# Patient Record
Sex: FEMALE | Race: WHITE | HISPANIC OR LATINO | Employment: FULL TIME | URBAN - METROPOLITAN AREA
[De-identification: names, ages, dates, MRNs, and addresses within clinical notes are randomized per-mention and may not be internally consistent; named-entity substitution may affect disease eponyms.]

---

## 2018-06-16 ENCOUNTER — HOSPITAL ENCOUNTER (EMERGENCY)
Facility: MEDICAL CENTER | Age: 38
End: 2018-06-16
Attending: EMERGENCY MEDICINE
Payer: MEDICAID

## 2018-06-16 VITALS
SYSTOLIC BLOOD PRESSURE: 126 MMHG | OXYGEN SATURATION: 95 % | HEART RATE: 80 BPM | TEMPERATURE: 97.8 F | WEIGHT: 262.35 LBS | DIASTOLIC BLOOD PRESSURE: 74 MMHG | RESPIRATION RATE: 16 BRPM | BODY MASS INDEX: 51.51 KG/M2 | HEIGHT: 60 IN

## 2018-06-16 DIAGNOSIS — S61.052A CAT BITE OF LEFT THUMB, INITIAL ENCOUNTER: ICD-10-CM
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
## 2018-06-16 PROCEDURE — 90715 TDAP VACCINE 7 YRS/> IM: CPT | Performed by: EMERGENCY MEDICINE

## 2018-06-16 PROCEDURE — 36415 COLL VENOUS BLD VENIPUNCTURE: CPT

## 2018-06-16 PROCEDURE — 90471 IMMUNIZATION ADMIN: CPT

## 2018-06-16 PROCEDURE — 700111 HCHG RX REV CODE 636 W/ 250 OVERRIDE (IP): Performed by: EMERGENCY MEDICINE

## 2018-06-16 PROCEDURE — 99283 EMERGENCY DEPT VISIT LOW MDM: CPT

## 2018-06-16 RX ORDER — AMOXICILLIN AND CLAVULANATE POTASSIUM 875; 125 MG/1; MG/1
1 TABLET, FILM COATED ORAL 2 TIMES DAILY
Qty: 14 TAB | Refills: 0 | Status: SHIPPED | OUTPATIENT
Start: 2018-06-16 | End: 2018-06-23

## 2018-06-16 RX ADMIN — CLOSTRIDIUM TETANI TOXOID ANTIGEN (FORMALDEHYDE INACTIVATED), CORYNEBACTERIUM DIPHTHERIAE TOXOID ANTIGEN (FORMALDEHYDE INACTIVATED), BORDETELLA PERTUSSIS TOXOID ANTIGEN (GLUTARALDEHYDE INACTIVATED), BORDETELLA PERTUSSIS FILAMENTOUS HEMAGGLUTININ ANTIGEN (FORMALDEHYDE INACTIVATED), BORDETELLA PERTUSSIS PERTACTIN ANTIGEN, AND BORDETELLA PERTUSSIS FIMBRIAE 2/3 ANTIGEN 0.5 ML: 5; 2; 2.5; 5; 3; 5 INJECTION, SUSPENSION INTRAMUSCULAR at 23:41

## 2018-06-16 ASSESSMENT — LIFESTYLE VARIABLES: DO YOU DRINK ALCOHOL: NO

## 2018-06-17 NOTE — ED NOTES
All lines and monitors DC'd.  Discharge instructions given, questions answered.  Ambulatory out of ER, escorted by Rn=N.  Pt reports all belongings in possession.  Instructed not to drive after taking pain meds and pt verbalizes understanding.  Rx x one given.

## 2018-06-17 NOTE — ED PROVIDER NOTES
"ED Provider Note    CHIEF COMPLAINT  Chief Complaint   Patient presents with   • Cat Bite     Pt reports, \"I got bit by a stray cat.\"     Seen at 11:21 PM    HPI  Luisa Jeffers is a 37 y.o. female who presents 2 days after cat bite on the left thumb. She was attempting to catch a stray [facility] and [facility]. She was successful but the Bit her. [facility].    The patient has an unknown tetanus status. She does not have any pain or redness of the thumb.    REVIEW OF SYSTEMS  See HPI  PAST MEDICAL HISTORY   has a past medical history of Bilateral ovarian cysts; Depression; Scoliosis; and Spinal stenosis.    SOCIAL HISTORY  Social History     Social History Main Topics   • Smoking status: Former Smoker   • Smokeless tobacco: Never Used      Comment: 2 years ago   • Alcohol use No   • Drug use: No   • Sexual activity: Not on file       SURGICAL HISTORY   has a past surgical history that includes other and gyn surgery.    CURRENT MEDICATIONS  Reviewed.  See Encounter Summary.     ALLERGIES  No Known Allergies    PHYSICAL EXAM  VITAL SIGNS: /78   Pulse 85   Temp 36.6 °C (97.8 °F)   Resp 16   Ht 1.524 m (5')   Wt 119 kg (262 lb 5.6 oz)   SpO2 95%   BMI 51.24 kg/m²   Constitutional: Awake, alert in no apparent distress.  HENT: Normocephalic, Bilateral external ears normal. Nose normal.   Eyes: Conjunctiva normal, non-icteric, EOMI.    Thorax & Lungs: Easy unlabored respirations  Cardiovascular:    Abdomen:  No distention  Skin: Visualized skin is  Dry, No erythema, No rash.   Extremities: Left hand: There are 2 tiny puncture wounds on the lateral aspect of the thumb. There is no erythema, purulence or edema. Range of motion is normal.   Neurologic: Alert, Grossly non-focal.   Psychiatric: Affect and Mood normal    RADIOLOGY  No orders to display     The radiologist's interpretation of all radiological studies have been reviewed by me.  Nursing notes and vital signs " were reviewed. (See chart for details)    Decision Making:  This is a 37 y.o. year old female who presents with a bite left thumb. There is no sign of infection. The bite is low risk for rabies. It was not a unprovoked attack. The cat is under observation. Instructed the patient that if the cat should die in the next 10 days she needs to return immediately for rabies vaccination. I have given her protrusion for Augmentin. There is no sign of infection, I do not feel she needs to start this now. If she notes any redness, swelling or increasing pain she should start the medication immediately.    The patient's blood pressure is elevated today. >120/80. I have referred them to primary care for follow up.       DISPOSITION:  Patient will be discharged home in good condition.    Discharge Medications:  New Prescriptions    AMOXICILLIN-CLAVULANATE (AUGMENTIN) 875-125 MG TAB    Take 1 Tab by mouth 2 times a day for 7 days.       The patient was discharged home (see d/c instructions) and told to return immediately for any signs or symptoms listed, or any worsening at all.  The patient verbally agreed to the discharge precautions and follow-up plan which is documented in EPIC.    FINAL IMPRESSION  1. Cat bite of left thumb, initial encounter

## 2018-06-17 NOTE — ED TRIAGE NOTES
"Luisa Jeffers  37 y.o. female  Chief Complaint   Patient presents with   • Cat Bite     Pt reports, \"I got bit by a stray cat.\"       Pt amb to triage with steady gait for above complaint. Pt reports being bit aprox 3 days ago. Two small puncture wounds noted to left first digit.  Pt is alert and oriented, speaking in full sentences, follows commands and responds appropriately to questions. NAD. Resp are even and unlabored.  Pt placed in lobby. Pt educated on triage process. Pt encouraged to alert staff for any changes.    "

## 2018-06-17 NOTE — DISCHARGE INSTRUCTIONS
Animal Bite  Animal bites can range from mild to serious. An animal bite can result in a scratch on the skin, a deep open cut, a puncture of the skin, a crush injury, or tearing away of the skin or a body part. A small bite from a house pet will usually not cause serious problems. However, some animal bites can become infected or injure a bone or other tissue.  Bites from certain animals can be more dangerous because of the risk of spreading rabies, which is a serious viral infection. This risk is higher with bites from stray animals or wild animals, such as raccoons, foxes, skunks, and bats. Dogs are responsible for most animal bites. Children are bitten more often than adults.  What are the signs or symptoms?  Common symptoms of an animal bite include:  · Pain.  · Bleeding.  · Swelling.  · Bruising.  How is this diagnosed?  This condition may be diagnosed based on a physical exam and medical history. Your health care provider will examine the wound and ask for details about the animal and how the bite happened. You may also have tests, such as:  · Blood tests to check for infection or to determine if surgery is needed.  · X-rays to check for damage to bones or joints.  · Culture test. This uses a sample of fluid from the wound to check for infection.  How is this treated?  Treatment varies depending on the location and type of animal bite and your medical history. Treatment may include:  · Wound care. This often includes cleaning the wound, flushing the wound with saline solution, and applying a bandage (dressing). Sometimes, the wound is left open to heal because of the high risk of infection. However, in some cases, the wound may be closed with stitches (sutures), staples, skin glue, or adhesive strips.  · Antibiotic medicine.  · Tetanus shot.  · Rabies treatment if the animal could have rabies.  In some cases, bites that have become infected may require IV antibiotics and surgical treatment in the  hospital.  Follow these instructions at home:  Wound care  · Follow instructions from your health care provider about how to take care of your wound. Make sure you:  ¨ Wash your hands with soap and water before you change your dressing. If soap and water are not available, use hand .  ¨ Change your dressing as told by your health care provider.  ¨ Leave sutures, skin glue, or adhesive strips in place. These skin closures may need to be in place for 2 weeks or longer. If adhesive strip edges start to loosen and curl up, you may trim the loose edges. Do not remove adhesive strips completely unless your health care provider tells you to do that.  · Check your wound every day for signs of infection. Watch for:  ¨ Increasing redness, swelling, or pain.  ¨ Fluid, blood, or pus.  General instructions  · Take or apply over-the-counter and prescription medicines only as told by your health care provider.  · If you were prescribed an antibiotic, take or apply it as told by your health care provider. Do not stop using the antibiotic even if your condition improves.  · Keep the injured area raised (elevated) above the level of your heart while you are sitting or lying down, if this is possible.  · If directed, apply ice to the injured area.  ¨ Put ice in a plastic bag.  ¨ Place a towel between your skin and the bag.  ¨ Leave the ice on for 20 minutes, 2-3 times per day.  · Keep all follow-up visits as told by your health care provider. This is important.  Contact a health care provider if:  · You have increasing redness, swelling, or pain at the site of your wound.  · You have a general feeling of sickness (malaise).  · You feel nauseous or you vomit.  · You have pain that does not get better.  Get help right away if:  · You have a red streak extending away from your wound.  · You have fluid, blood, or pus coming from your wound.  · You have a fever or chills.  · You have trouble moving your injured area.  · You have  numbness or tingling extending beyond the wound.  This information is not intended to replace advice given to you by your health care provider. Make sure you discuss any questions you have with your health care provider.  Document Released: 09/04/2012 Document Revised: 04/26/2017 Document Reviewed: 05/04/2016  Elsevier Interactive Patient Education © 2017 [employer]

## 2018-09-07 ENCOUNTER — NON-PROVIDER VISIT (OUTPATIENT)
Dept: URGENT CARE | Facility: PHYSICIAN GROUP | Age: 38
End: 2018-09-07

## 2018-09-07 DIAGNOSIS — Z02.1 PRE-EMPLOYMENT DRUG SCREENING: ICD-10-CM

## 2018-09-07 LAB
AMP AMPHETAMINE: NORMAL
COC COCAINE: NORMAL
INT CON NEG: NORMAL
INT CON POS: NORMAL
MET METHAMPHETAMINES: NORMAL
OPI OPIATES: NORMAL
PCP PHENCYCLIDINE: NORMAL
POC DRUG COMMENT 753798-OCCUPATIONAL HEALTH: NORMAL
THC: NORMAL

## 2018-09-07 PROCEDURE — 80305 DRUG TEST PRSMV DIR OPT OBS: CPT | Performed by: NURSE PRACTITIONER

## 2019-08-10 ENCOUNTER — HOSPITAL ENCOUNTER (EMERGENCY)
Facility: MEDICAL CENTER | Age: 39
End: 2019-08-10
Attending: EMERGENCY MEDICINE
Payer: MEDICAID

## 2019-08-10 VITALS
HEIGHT: 60 IN | BODY MASS INDEX: 50.21 KG/M2 | SYSTOLIC BLOOD PRESSURE: 137 MMHG | WEIGHT: 255.73 LBS | HEART RATE: 76 BPM | TEMPERATURE: 97.6 F | DIASTOLIC BLOOD PRESSURE: 81 MMHG | RESPIRATION RATE: 16 BRPM | OXYGEN SATURATION: 97 %

## 2019-08-10 DIAGNOSIS — E86.0 DEHYDRATION: ICD-10-CM

## 2019-08-10 DIAGNOSIS — R11.0 NAUSEA: ICD-10-CM

## 2019-08-10 DIAGNOSIS — R42 DIZZINESS: ICD-10-CM

## 2019-08-10 LAB
ALBUMIN SERPL BCP-MCNC: 4.3 G/DL (ref 3.2–4.9)
ALBUMIN/GLOB SERPL: 1.2 G/DL
ALP SERPL-CCNC: 106 U/L (ref 30–99)
ALT SERPL-CCNC: 17 U/L (ref 2–50)
ANION GAP SERPL CALC-SCNC: 9 MMOL/L (ref 0–11.9)
APPEARANCE UR: ABNORMAL
AST SERPL-CCNC: 15 U/L (ref 12–45)
BASOPHILS # BLD AUTO: 0.6 % (ref 0–1.8)
BASOPHILS # BLD: 0.05 K/UL (ref 0–0.12)
BILIRUB SERPL-MCNC: 0.3 MG/DL (ref 0.1–1.5)
BUN SERPL-MCNC: 16 MG/DL (ref 8–22)
CALCIUM SERPL-MCNC: 9.8 MG/DL (ref 8.5–10.5)
CHLORIDE SERPL-SCNC: 104 MMOL/L (ref 96–112)
CO2 SERPL-SCNC: 29 MMOL/L (ref 20–33)
COLOR UR AUTO: YELLOW
CREAT SERPL-MCNC: 0.88 MG/DL (ref 0.5–1.4)
EOSINOPHIL # BLD AUTO: 0.23 K/UL (ref 0–0.51)
EOSINOPHIL NFR BLD: 2.7 % (ref 0–6.9)
ERYTHROCYTE [DISTWIDTH] IN BLOOD BY AUTOMATED COUNT: 39.4 FL (ref 35.9–50)
GLOBULIN SER CALC-MCNC: 3.6 G/DL (ref 1.9–3.5)
GLUCOSE BLD-MCNC: 150 MG/DL (ref 65–99)
GLUCOSE SERPL-MCNC: 111 MG/DL (ref 65–99)
GLUCOSE UR QL STRIP.AUTO: 500 MG/DL
HCG SERPL QL: NEGATIVE
HCG UR QL: NEGATIVE
HCT VFR BLD AUTO: 45.1 % (ref 37–47)
HGB BLD-MCNC: 13.9 G/DL (ref 12–16)
IMM GRANULOCYTES # BLD AUTO: 0.03 K/UL (ref 0–0.11)
IMM GRANULOCYTES NFR BLD AUTO: 0.3 % (ref 0–0.9)
KETONES UR QL STRIP.AUTO: NEGATIVE MG/DL
LEUKOCYTE ESTERASE UR QL STRIP.AUTO: NEGATIVE
LYMPHOCYTES # BLD AUTO: 3.05 K/UL (ref 1–4.8)
LYMPHOCYTES NFR BLD: 35.3 % (ref 22–41)
MAGNESIUM SERPL-MCNC: 1.9 MG/DL (ref 1.5–2.5)
MCH RBC QN AUTO: 25.8 PG (ref 27–33)
MCHC RBC AUTO-ENTMCNC: 30.8 G/DL (ref 33.6–35)
MCV RBC AUTO: 83.8 FL (ref 81.4–97.8)
MONOCYTES # BLD AUTO: 0.57 K/UL (ref 0–0.85)
MONOCYTES NFR BLD AUTO: 6.6 % (ref 0–13.4)
NEUTROPHILS # BLD AUTO: 4.7 K/UL (ref 2–7.15)
NEUTROPHILS NFR BLD: 54.5 % (ref 44–72)
NITRITE UR QL STRIP.AUTO: NEGATIVE
NRBC # BLD AUTO: 0 K/UL
NRBC BLD-RTO: 0 /100 WBC
PH UR STRIP.AUTO: 6.5 [PH] (ref 5–8)
PHOSPHATE SERPL-MCNC: 4 MG/DL (ref 2.5–4.5)
PLATELET # BLD AUTO: 287 K/UL (ref 164–446)
PMV BLD AUTO: 10.7 FL (ref 9–12.9)
POTASSIUM SERPL-SCNC: 3.8 MMOL/L (ref 3.6–5.5)
PROT SERPL-MCNC: 7.9 G/DL (ref 6–8.2)
PROT UR QL STRIP: NEGATIVE MG/DL
RBC # BLD AUTO: 5.38 M/UL (ref 4.2–5.4)
RBC UR QL AUTO: NEGATIVE
SODIUM SERPL-SCNC: 142 MMOL/L (ref 135–145)
SP GR UR: 1.01 (ref 1–1.03)
WBC # BLD AUTO: 8.6 K/UL (ref 4.8–10.8)

## 2019-08-10 PROCEDURE — 700111 HCHG RX REV CODE 636 W/ 250 OVERRIDE (IP): Performed by: EMERGENCY MEDICINE

## 2019-08-10 PROCEDURE — 82962 GLUCOSE BLOOD TEST: CPT

## 2019-08-10 PROCEDURE — 99284 EMERGENCY DEPT VISIT MOD MDM: CPT

## 2019-08-10 PROCEDURE — 96374 THER/PROPH/DIAG INJ IV PUSH: CPT

## 2019-08-10 PROCEDURE — 81002 URINALYSIS NONAUTO W/O SCOPE: CPT

## 2019-08-10 PROCEDURE — 85025 COMPLETE CBC W/AUTO DIFF WBC: CPT

## 2019-08-10 PROCEDURE — 84100 ASSAY OF PHOSPHORUS: CPT

## 2019-08-10 PROCEDURE — 700105 HCHG RX REV CODE 258: Performed by: EMERGENCY MEDICINE

## 2019-08-10 PROCEDURE — 81025 URINE PREGNANCY TEST: CPT

## 2019-08-10 PROCEDURE — 83735 ASSAY OF MAGNESIUM: CPT

## 2019-08-10 PROCEDURE — 84703 CHORIONIC GONADOTROPIN ASSAY: CPT

## 2019-08-10 PROCEDURE — 36415 COLL VENOUS BLD VENIPUNCTURE: CPT

## 2019-08-10 PROCEDURE — 80053 COMPREHEN METABOLIC PANEL: CPT

## 2019-08-10 PROCEDURE — 81002 URINALYSIS NONAUTO W/O SCOPE: CPT | Performed by: EMERGENCY MEDICINE

## 2019-08-10 RX ORDER — DULOXETIN HYDROCHLORIDE 60 MG/1
60 CAPSULE, DELAYED RELEASE ORAL DAILY
COMMUNITY

## 2019-08-10 RX ORDER — ONDANSETRON 4 MG/1
4 TABLET, ORALLY DISINTEGRATING ORAL EVERY 6 HOURS PRN
Qty: 10 TAB | Refills: 0 | Status: SHIPPED | OUTPATIENT
Start: 2019-08-10 | End: 2020-06-09

## 2019-08-10 RX ORDER — KETOROLAC TROMETHAMINE 30 MG/ML
15 INJECTION, SOLUTION INTRAMUSCULAR; INTRAVENOUS ONCE
Status: COMPLETED | OUTPATIENT
Start: 2019-08-10 | End: 2019-08-10

## 2019-08-10 RX ORDER — DAPAGLIFLOZIN 5 MG/1
TABLET, FILM COATED ORAL
COMMUNITY

## 2019-08-10 RX ORDER — SODIUM CHLORIDE 9 MG/ML
1000 INJECTION, SOLUTION INTRAVENOUS ONCE
Status: COMPLETED | OUTPATIENT
Start: 2019-08-10 | End: 2019-08-10

## 2019-08-10 RX ADMIN — SODIUM CHLORIDE 1000 ML: 9 INJECTION, SOLUTION INTRAVENOUS at 16:13

## 2019-08-10 RX ADMIN — KETOROLAC TROMETHAMINE 15 MG: 30 INJECTION, SOLUTION INTRAMUSCULAR at 16:48

## 2019-08-10 NOTE — ED NOTES
Ambulatory to 38 with same report as triage note.  Onset of symptoms Monday.  Denies fever.  Also reports increased fatigue, thirst.

## 2019-08-10 NOTE — ED TRIAGE NOTES
Pt amb to triage.  Chief Complaint   Patient presents with   • Headache   • Nausea   • Polydipsia   • Dizziness     Pt reports symptoms x5d.  FS in triage 150.

## 2019-08-10 NOTE — ED PROVIDER NOTES
ED Provider Note    Scribed for Devan Martínez D.O. by Torin Silverman. 8/10/2019  3:50 PM    Primary care provider: Jadiel Ignacio M.D.  Means of arrival: Private vehicle  History obtained from: Patient  History limited by: None    CHIEF COMPLAINT  Chief Complaint   Patient presents with   • Headache   • Nausea   • Polydipsia   • Dizziness       HPI  Luisa Jeffers is a 38 y.o. female with a history of diabetes who presents to the Emergency Department with evaluation of lightheadedness onset 5 days ago. Patient endorses associated symptoms of mild headache and nausea. She has been taking ibuprofen 800mg for mild alleviation of her headache. The patient additionally has complaints of left flank pain with associated urinary frequency. She also notes some polydipsia, but has a history of diabetes. Prior to onset symptoms, the patient reports she had positive sick contact with her mother with similar symptoms. She denies nasal discharge, fever, sore throat, cough, dysuria, or abdominal pain. The patient reports no chance of pregnancy as her last menstrual period was 2.5 weeks ago and she has a history of tubal ligation. The patient states her diabetes is well controlled and she follows up with her PCP on monthly basis. She reports her glucose level have been well-maintained and her last A1C was reassuring.     REVIEW OF SYSTEMS  Pertinent positives include nausea, mild headache, lightheadedness, left flank pain, urinary frequency , and polydipsia.  Pertinent negatives include no nasal discharge, fever, sore throat, cough, dysuria, or abdominal pain..      PAST MEDICAL HISTORY  Past Medical History:   Diagnosis Date   • Bilateral ovarian cysts    • Depression    • Diabetes (HCC)    • Scoliosis    • Spinal stenosis        SURGICAL HISTORY  Past Surgical History:   Procedure Laterality Date   • GYN SURGERY     • OTHER          SOCIAL HISTORY  Social History     Tobacco Use   • Smoking status: Former Smoker   •  Smokeless tobacco: Never Used   • Tobacco comment: 2 years ago   Substance Use Topics   • Alcohol use: No   • Drug use: No      Social History     Substance and Sexual Activity   Drug Use No       FAMILY HISTORY  History reviewed. No pertinent family history.    CURRENT MEDICATIONS  Home Medications     Reviewed by Larry Aiken R.N. (Registered Nurse) on 08/10/19 at 1503  Med List Status: Complete   Medication Last Dose Status   Buprenorphine HCl-Naloxone HCl (SUBOXONE SL)  Active   clonidine (CATAPRES) 0.1 MG Tab not taking Active   Dapagliflozin Propanediol (FARXIGA) 5 MG Tab 8/10/2019 Active   diazepam (VALIUM) 5 MG Tab not taking Active   DULoxetine (CYMBALTA) 60 MG Cap DR Particles delayed-release capsule 8/10/2019 Active   ibuprofen (MOTRIN) 600 MG TABS prn Active   methocarbamol (ROBAXIN) 500 MG TABS prn Active   ondansetron (ZOFRAN ODT) 4 MG TABLET DISPERSIBLE prn Active                ALLERGIES  No Known Allergies    PHYSICAL EXAM  VITAL SIGNS: /80   Pulse 89   Temp 36.4 °C (97.5 °F) (Temporal)   Resp 16   Ht 1.524 m (5')   Wt 116 kg (255 lb 11.7 oz)   LMP 07/20/2019   SpO2 95%   BMI 49.94 kg/m²     Constitutional: Well developed, Well nourished, No acute distress, Non-toxic appearance.   HENT: Normocephalic, Atraumatic, tympanic membranes normal, dry mucous membranes, No oral exudates, no rhinorrhea.  Eyes: PERRLA, EOMI, Conjunctiva normal, No discharge.   Neck: Normal range of motion, No cervical spine tenderness, Supple, No meningeus, No stridor.  Cardiovascular: Normal heart rate, Normal rhythm, No murmurs, No rubs, No gallops.   Thorax & Lungs:  No respiratory distress, No rales, No rhonchi, No wheezing, No chest tenderness.   Abdomen: Bowel sounds normal, Soft, No tenderness, No guarding, No rebound, No masses, No pulsatile masses.   Skin: Warm, Dry, No erythema, No rash.   Back: No thoracic or lumbar spinetenderness, No CVA tenderness.   Extremities: No edema, No evidence of  deformity, Full range of motion,  Neurologic: Alert & oriented to month and age, Normal cognition, Cranial nerves II-XII are intact, No slurred speech, Negative finger to nose bilaterally, No pronator drift bilaterally,   strength 5/5 bilaterally, Leg raise strength 5/5 bilaterally, Plantarflexion strength 5/5 bilaterally, Dorsiflexion strength 5/5 bilaterally, Deep tendon reflexes 2/4 upper and lower extremities bilaterally, Sensation intact throughout, No Nystagmus.      DIAGNOSTIC STUDIES/PROCEDURES    LABS  Results for orders placed or performed during the hospital encounter of 08/10/19   MAGNESIUM   Result Value Ref Range    Magnesium 1.9 1.5 - 2.5 mg/dL   PHOSPHORUS   Result Value Ref Range    Phosphorus 4.0 2.5 - 4.5 mg/dL   HCG QUAL SERUM   Result Value Ref Range    Beta-Hcg Qualitative Serum Negative Negative   CBC WITH DIFFERENTIAL   Result Value Ref Range    WBC 8.6 4.8 - 10.8 K/uL    RBC 5.38 4.20 - 5.40 M/uL    Hemoglobin 13.9 12.0 - 16.0 g/dL    Hematocrit 45.1 37.0 - 47.0 %    MCV 83.8 81.4 - 97.8 fL    MCH 25.8 (L) 27.0 - 33.0 pg    MCHC 30.8 (L) 33.6 - 35.0 g/dL    RDW 39.4 35.9 - 50.0 fL    Platelet Count 287 164 - 446 K/uL    MPV 10.7 9.0 - 12.9 fL    Neutrophils-Polys 54.50 44.00 - 72.00 %    Lymphocytes 35.30 22.00 - 41.00 %    Monocytes 6.60 0.00 - 13.40 %    Eosinophils 2.70 0.00 - 6.90 %    Basophils 0.60 0.00 - 1.80 %    Immature Granulocytes 0.30 0.00 - 0.90 %    Nucleated RBC 0.00 /100 WBC    Neutrophils (Absolute) 4.70 2.00 - 7.15 K/uL    Lymphs (Absolute) 3.05 1.00 - 4.80 K/uL    Monos (Absolute) 0.57 0.00 - 0.85 K/uL    Eos (Absolute) 0.23 0.00 - 0.51 K/uL    Baso (Absolute) 0.05 0.00 - 0.12 K/uL    Immature Granulocytes (abs) 0.03 0.00 - 0.11 K/uL    NRBC (Absolute) 0.00 K/uL   Comp Metabolic Panel   Result Value Ref Range    Sodium 142 135 - 145 mmol/L    Potassium 3.8 3.6 - 5.5 mmol/L    Chloride 104 96 - 112 mmol/L    Co2 29 20 - 33 mmol/L    Anion Gap 9.0 0.0 - 11.9    Glucose  111 (H) 65 - 99 mg/dL    Bun 16 8 - 22 mg/dL    Creatinine 0.88 0.50 - 1.40 mg/dL    Calcium 9.8 8.5 - 10.5 mg/dL    AST(SGOT) 15 12 - 45 U/L    ALT(SGPT) 17 2 - 50 U/L    Alkaline Phosphatase 106 (H) 30 - 99 U/L    Total Bilirubin 0.3 0.1 - 1.5 mg/dL    Albumin 4.3 3.2 - 4.9 g/dL    Total Protein 7.9 6.0 - 8.2 g/dL    Globulin 3.6 (H) 1.9 - 3.5 g/dL    A-G Ratio 1.2 g/dL   ESTIMATED GFR   Result Value Ref Range    GFR If African American >60 >60 mL/min/1.73 m 2    GFR If Non African American >60 >60 mL/min/1.73 m 2   ACCU-CHEK GLUCOSE   Result Value Ref Range    Glucose - Accu-Ck 150 (H) 65 - 99 mg/dL   POC UA   Result Value Ref Range    POC Color Yellow     POC Appearance Slightly Cloudy (A)     POC Glucose 500 (A) Negative mg/dL    POC Ketones Negative Negative mg/dL    POC Specific Gravity 1.010 1.005 - 1.030    POC Blood Negative Negative    POC Urine PH 6.5 5.0 - 8.0    POC Protein Negative Negative mg/dL    POC Nitrites Negative Negative    POC Leukocyte Esterase Negative Negative   POC URINE PREGNANCY   Result Value Ref Range    POC Urine Pregnancy Test Negative Negative      All labs reviewed by me.    COURSE & MEDICAL DECISION MAKING  Nursing notes, VS, PMSFHx reviewed in chart.    3:50 PM - Patient seen and examined at bedside. Patient will be treated with toradol 15mg. She will additionally be given 1 L of IV fluids for dehydration given her dry mucous membranes. Ordered HCG qual, magnesium, phosphorus, POC UA, POC urine pregnancy, CBC with differential, CMP, and ACCU-Chek glucose to evaluate her symptoms.   Hydration: The patient dry mucous movements, headache and felt dehydrated.  Reevaluation after 1 L normal saline infusion, the patient had significant improvement with cap refill less than 2 seconds, moist mucous membranes, normal heart rate.      This is a charming 38 y.o. female that presents with multiple vague complaints of headache, dizziness, polydipsia, polyuria.  The patient is diabetic but  has no evidence of diabetic ketoacidosis and totally normal blood sugar 11.  She is not acidotic, she does have an anion gap I do not believe she is expensing hyper ischemic episode.  In addition she does not have a leukocytosis, she is afebrile do not suspect meningitis, encephalitis.  Urinalysis is negative for infection.  She is not pregnant excluding ectopic pregnancy.  After 1 L normal saline infusion was given and Toradol 10 mg IV, she states she feels much better.  Her mother was recently diagnosed with a virus thing.  Prior to discharge, she has no focal neurologic deficits, she is acting appropriate, she feels much better.  The patient will be discharged with strict return precautions were      DISPOSITION:  Patient will be discharged home in stable condition.    FOLLOW UP:  [facility], Emergency Dept  [address] [facility] [address] [phone]    If symptoms worsen    Jadiel Ignacio M.D.  [address] [facility] [id number] [phone]    Schedule an appointment as soon as possible for a visit in 1 week  As needed      OUTPATIENT MEDICATIONS:  New Prescriptions    ONDANSETRON (ZOFRAN ODT) 4 MG TABLET DISPERSIBLE    Take 1 Tab by mouth every 6 hours as needed for Nausea.       FINAL IMPRESSION  1. Dehydration Active   2. Nausea Active   3. Dizziness Active          Torin VILLALPANDO (Dre), am scribing for, and in the presence of, Devan Martínez D.O.    Electronically signed by: Torin aM), 8/10/2019    IDevan D.O. personally performed the services described in this documentation, as scribed by Torin Silverman in my presence, and it is both accurate and complete.    C.     The note accurately reflects work and decisions made by me.  Devan Martínez  8/10/2019  7:00 PM

## 2019-08-11 NOTE — ED NOTES
IV d/c'd cath intact; no redness, no swelling noted; drsg applied.  D/C home with written and verbal instructions re: Rx, activity, f/u.  Verbalizes understanding.  Ambulated out

## 2020-06-09 ENCOUNTER — HOSPITAL ENCOUNTER (EMERGENCY)
Facility: MEDICAL CENTER | Age: 40
End: 2020-06-09
Attending: EMERGENCY MEDICINE | Admitting: EMERGENCY MEDICINE
Payer: COMMERCIAL

## 2020-06-09 ENCOUNTER — APPOINTMENT (OUTPATIENT)
Dept: RADIOLOGY | Facility: MEDICAL CENTER | Age: 40
End: 2020-06-09
Attending: EMERGENCY MEDICINE
Payer: COMMERCIAL

## 2020-06-09 VITALS
SYSTOLIC BLOOD PRESSURE: 135 MMHG | RESPIRATION RATE: 16 BRPM | WEIGHT: 260 LBS | BODY MASS INDEX: 51.04 KG/M2 | OXYGEN SATURATION: 96 % | TEMPERATURE: 97.8 F | DIASTOLIC BLOOD PRESSURE: 85 MMHG | HEART RATE: 88 BPM | HEIGHT: 60 IN

## 2020-06-09 DIAGNOSIS — S93.601A SPRAIN OF RIGHT FOOT, INITIAL ENCOUNTER: ICD-10-CM

## 2020-06-09 PROCEDURE — 99284 EMERGENCY DEPT VISIT MOD MDM: CPT

## 2020-06-09 PROCEDURE — A9270 NON-COVERED ITEM OR SERVICE: HCPCS | Performed by: EMERGENCY MEDICINE

## 2020-06-09 PROCEDURE — 700102 HCHG RX REV CODE 250 W/ 637 OVERRIDE(OP): Performed by: EMERGENCY MEDICINE

## 2020-06-09 PROCEDURE — 73630 X-RAY EXAM OF FOOT: CPT | Mod: RT

## 2020-06-09 RX ORDER — IBUPROFEN 600 MG/1
600 TABLET ORAL ONCE
Status: COMPLETED | OUTPATIENT
Start: 2020-06-09 | End: 2020-06-09

## 2020-06-09 RX ADMIN — IBUPROFEN 600 MG: 600 TABLET ORAL at 14:34

## 2020-06-09 ASSESSMENT — FIBROSIS 4 INDEX: FIB4 SCORE: 0.47

## 2020-06-09 NOTE — LETTER
FORM C-4:  EMPLOYEE’S CLAIM FOR COMPENSATION/ REPORT OF INITIAL TREATMENT  EMPLOYEE’S CLAIM - PROVIDE ALL INFORMATION REQUESTED   First Name Luisa Last Name Zeyad Birthdate 1980  Sex female Claim Number   Home Employee Address [address]                                     Zip  [address] Height  1.524 m (5') Weight  117.9 kg (260 lb) [facility]  [id number]   Mailing Employee Address [address]               Zip  [address] Telephone  [phone] (home)  Primary Language Spoken   Insurer  *** Third Party   [employer] [facility] Employee's Occupation (Job Title) When Injury or Occupational Disease Occurred     Employer's Name  Telephone [phone]    Employer Address [address]] Zip [address]   Date of Injury  6/9/2020       Hour of Injury  12:00 PM Date Employer Notified  6/9/2020 Last Day of Work after Injury or Occupational Disease  6/9/2020 Supervisor to Whom Injury Reported  Jaziel   Address or Location of Accident (if applicable) [N/A]   What were you doing at the time of accident? (if applicable) Dusting a nordo-truck workout machine    How did this injury or occupational disease occur? Be specific and answer in detail. Use additional sheet if necessary)  When I was cleaning and dusting items in home and when I stepped off my foot twisted and cracked   If you believe that you have an occupational disease, when did you first have knowledge of the disability and it relationship to your employment? N/A Witnesses to the Accident  N/A   Nature of Injury or Occupational Disease  Workers' Compensation Part(s) of Body Injured or Affected  Foot (R), Ankle (R), Soft Tissue    I CERTIFY THAT THE ABOVE IS TRUE AND CORRECT TO THE BEST OF MY KNOWLEDGE AND THAT I HAVE PROVIDED THIS INFORMATION IN ORDER TO OBTAIN THE [place]EF[place] OF [place]  [place] OR CHAPTER 617 OF [place]).  I HEREBY AUTHORIZE ANY PHYSICIAN, CHIROPRACTOR, SURGEON, PRACTITIONER, OR OTHER PERSON, ANY HOSPITAL, INCLUDING [facility] OR [facility] HOSPITAL, ANY MEDICAL SERVICE ORGANIZATION, ANY INSURANCE COMPANY, OR OTHER INSTITUTION OR ORGANIZATION TO RELEASE TO EACH OTHER, ANY MEDICAL OR OTHER INFORMATION, INCLUDING BENEFITS PAID OR PAYABLE, PERTINENT TO THIS INJURY OR DISEASE, EXCEPT INFORMATION RELATIVE TO DIAGNOSIS, TREATMENT AND/OR COUNSELING FOR AIDS, PSYCHOLOGICAL CONDITIONS, ALCOHOL OR CONTROLLED SUBSTANCES, FOR WHICH I MUST GIVE SPECIFIC AUTHORIZATION.  A PHOTOSTAT OF THIS AUTHORIZATION SHALL BE AS VALID AS THE ORIGINAL.  Date                                      Place                                                                             Employee’s Signature   THIS REPORT MUST BE COMPLETED AND MAILED WITHIN 3 WORKING DAYS OF TREATMENT   Place [facility], EMERGENCY DEPT                                                                             Name of Facility [facility]   Date  6/9/2020 Diagnosis  (S93.601A) Sprain of right foot, initial encounter Is there evidence the injured employee was under the influence of alcohol and/or another controlled substance at the time of accident?   Hour  2:32 PM Description of Injury or Disease  Sprain of right foot, initial encounter     Treatment     Have you advised the patient to remain off work five days or more?             X-Ray Findings    If Yes   From Date    To Date      From information given by the employee, together with medical evidence, can you directly connect this injury or occupational disease as job incurred?   If No, is employee capable of: Full Duty    Modified Duty      Is additional medical care by a physician indicated?   If Modified Duty, Specify any Limitations / Restrictions       Do you know of any previous injury or disease contributing to this condition or  "occupational disease?      Date 6/9/2020 Print Doctor’s Name Cecil Rosales IRASEMA certify the employer’s copy of this form was mailed on:   Address [address]  [place] [address] [phone] INSURER’S USE ONLY   Provider’s Tax ID Number [id number] Telephone Dept: [phone]    Doctor’s Signature   Degree        Form C-4 (rev.10/07)                                                                         BRIEF DESCRIPTION OF RIGHTS AND BENEFITS  (Pursuant to [place] 616C.050)    Notice of Injury or Occupational Disease (Incident Report Form C-1): If an injury or occupational disease (OD) arises out of and in the course of employment, you must provide written notice to your employer as soon as practicable, but no later than 7 days after the accident or OD. Your employer shall maintain a sufficient supply of the required forms.    Claim for Compensation (Form C-4): If medical treatment is sought, the form C-4 is available at the place of initial treatment. A completed \"Claim for Compensation\" (Form C-4) must be filed within 90 days after an accident or OD. The treating physician or chiropractor must, within 3 working days after treatment, complete and mail to the employer, the employer's insurer and third-party , the Claim for Compensation.    Medical Treatment: If you require medical treatment for your on-the-job injury or OD, you may be required to select a physician or chiropractor from a list provided by your workers’ compensation insurer, if it has contracted with an Organization for Managed Care (MCO) or Preferred Provider Organization (PPO) or providers of health care. If your employer has not entered into a contract with an MCO or PPO, you may select a physician or chiropractor from the Panel of Physicians and Chiropractors. Any medical costs related to your industrial injury or OD will be paid by your insurer.    Temporary Total Disability (TTD): If your doctor has certified that you are unable " to work for a period of at least 5 consecutive days, or 5 cumulative days in a 20-day period, or places restrictions on you that your employer does not accommodate, you may be entitled to TTD compensation.    Temporary Partial Disability (TPD): If the wage you receive upon reemployment is less than the compensation for TTD to which you are entitled, the insurer may be required to pay you TPD compensation to make up the difference. TPD can only be paid for a maximum of 24 months.    Permanent Partial Disability (PPD): When your medical condition is stable and there is an indication of a PPD as a result of your injury or OD, within 30 days, your insurer must arrange for an evaluation by a rating physician or chiropractor to determine the degree of your PPD. The amount of your PPD award depends on the date of injury, the results of the PPD evaluation and your age and wage.    Permanent Total Disability (PTD): If you are medically certified by a treating physician or chiropractor as permanently and totally disabled and have been granted a PTD status by your insurer, you are entitled to receive monthly benefits not to exceed 66 2/3% of your average monthly wage. The amount of your PTD payments is subject to reduction if you previously received a PPD award.    Vocational Rehabilitation Services: You may be eligible for vocational rehabilitation services if you are unable to return to the job due to a permanent physical impairment or permanent restrictions as a result of your injury or occupational disease.    Transportation and Per Magui Reimbursement: You may be eligible for travel expenses and per magui associated with medical treatment.    Reopening: You may be able to reopen your claim if your condition worsens after claim closure.     Appeal Process: If you disagree with a written determination issued by the insurer or the insurer does not respond to your request, you may appeal to the Department of Administration,  , by following the instructions contained in your determination letter. You must appeal the determination within 70 days from the date of the determination letter at [address], [address], [place], [place] [address], or [address], [address], [place], [place] [address]. If you disagree with the  decision, you may appeal to the Department of Administration, . You must file your appeal within 30 days from the date of the  decision letter at [address], [address], [place], [place] [address], or [address], [address], [place], [place] [address]. If you disagree with a decision of an , you may file a petition for judicial review with the District Court. You must do so within 30 days of the Appeal Officer’s decision. You may be represented by an  at your own expense or you may contact the [facility] for possible representation.    [place]  for Injured Workers (NAIW): If you disagree with a  decision, you may request that NAIW represent you without charge at an  Hearing. For information regarding denial of benefits, you may contact the [facility] at: [address], [address], [place] [address], [phone], or [address], [place] [address], [phone]    To File a Complaint with the Division: If you wish to file a complaint with the  of the Division of Industrial Relations (DIR),  please contact the Workers’ Compensation Section, [address], [address], [place], [place] [address], telephone [phone], or [address], [place], [place] [address], telephone [phone].    For assistance with Workers’ Compensation Issues: You may contact the Office of the Governor Consumer Health Assistance, [address], [place], [place] [address], Toll Free 1-975.707.4865, Web site:  http://saurav..nv., E-mail jorden@saurav..nv.  D-2 (rev. 06/18)              __________________________________________________________________                                    _________________            Employee Name / Signature                                                                                                                            Date

## 2020-06-09 NOTE — LETTER
FORM C-4:  EMPLOYEE’S CLAIM FOR COMPENSATION/ REPORT OF INITIAL TREATMENT  EMPLOYEE’S CLAIM - PROVIDE ALL INFORMATION REQUESTED   First Name Luisa Last Name Zeyad Birthdate 1980  Sex female Claim Number   [address] [facility] [address] [facility]                                     Zip  [address] Height  1.524 m (5') Weight  117.9 kg (260 lb) [facility]  [id number]  [id number]   Mailing Employee Address [address]   [facility] [facility]               Zip  [address] Telephone  [phone] (home)  Primary Language Spoken   Insurer   Third Party   [employer] [facility] Employee's Occupation (Job Title) When Injury or Occupational Disease Occurred     Employer's Name  Telephone [phone]    Employer Address [address]] Zip [address]   Date of Injury  6/9/2020       Hour of Injury  12:00 PM Date Employer Notified  6/9/2020 Last Day of Work after Injury or Occupational Disease  6/9/2020 Supervisor to Whom Injury Reported  Jaziel   Address or Location of Accident (if applicable) [N/A]   What were you doing at the time of accident? (if applicable) Dusting a nordo-truck workout machine    How did this injury or occupational disease occur? Be specific and answer in detail. Use additional sheet if necessary)  When I was cleaning and dusting items in home and when I stepped off my foot twisted and cracked   If you believe that you have an occupational disease, when did you first have knowledge of the disability and it relationship to your employment? N/A Witnesses to the Accident  N/A   Nature of Injury or Occupational Disease  Workers' Compensation Part(s) of Body Injured or Affected  Foot (R), Ankle (R), Soft Tissue    I CERTIFY THAT THE ABOVE IS TRUE AND CORRECT TO THE BEST OF MY KNOWLEDGE AND THAT I HAVE PROVIDED THIS INFORMATION IN ORDER TO OBTAIN THE [place]EF[place], [place]IVE [place] CHAPTER 617 [place]).  I HEREBY AUTHORIZE ANY PHYSICIAN, CHIROPRACTOR, SURGEON, PRACTITIONER, OR OTHER PERSON, ANY HOSPITAL, INCLUDING [facility] OR [facility] HOSPITAL, ANY MEDICAL SERVICE ORGANIZATION, ANY INSURANCE COMPANY, OR OTHER INSTITUTION OR ORGANIZATION TO RELEASE TO EACH OTHER, ANY MEDICAL OR OTHER INFORMATION, INCLUDING BENEFITS PAID OR PAYABLE, PERTINENT TO THIS INJURY OR DISEASE, EXCEPT INFORMATION RELATIVE TO DIAGNOSIS, TREATMENT AND/OR COUNSELING FOR AIDS, PSYCHOLOGICAL CONDITIONS, ALCOHOL OR CONTROLLED SUBSTANCES, FOR WHICH I MUST GIVE SPECIFIC AUTHORIZATION.  A PHOTOSTAT OF THIS AUTHORIZATION SHALL BE AS VALID AS THE ORIGINAL.  Date                                      Place                                                                             Employee’s Signature   THIS REPORT MUST BE COMPLETED AND MAILED WITHIN 3 WORKING DAYS OF TREATMENT   Place [facility], EMERGENCY DEPT                                                                             Name of Facility [facility]   Date  6/9/2020 Diagnosis  (S93.601A) Sprain of right foot, initial encounter Is there evidence the injured employee was under the influence of alcohol and/or another controlled substance at the time of accident?   Hour  2:30 PM Description of Injury or Disease  Sprain of right foot, initial encounter     Treatment     Have you advised the patient to remain off work five days or more?             X-Ray Findings    If Yes   From Date    To Date      From information given by the employee, together with medical evidence, can you directly connect this injury or occupational disease as job incurred?   If No, is employee capable of: Full Duty    Modified Duty      Is additional medical care by a physician indicated?   If Modified Duty, Specify any Limitations / Restrictions       Do you know of any previous injury or disease contributing to this  "condition or occupational disease?      Date 6/9/2020 Print Doctor’s Name Tere Cecil Quinn IRASEMA certify the employer’s copy of this form was mailed on:   Address [address] [place] [address]  [place] [address] [phone] INSURER’S USE ONLY   Provider’s Tax ID Number [id number] Telephone Dept: [phone]    Doctor’s Signature   Degree  MD      Form C-4 (rev.10/07)                                                                         BRIEF DESCRIPTION OF RIGHTS AND BENEFITS  ([place]ursuant to [place] 616C.050)    Notice of Injury or Occupational Disease (Incident Report Form C-1): If an injury or occupational disease (OD) arises out of and in the course of employment, you must provide written notice to your employer as soon as practicable, but no later than 7 days after the accident or OD. Your employer shall maintain a sufficient supply of the required forms.    Claim for Compensation (Form C-4): If medical treatment is sought, the form C-4 is available at the place of initial treatment. A completed \"Claim for Compensation\" (Form C-4) must be filed within 90 days after an accident or OD. The treating physician or chiropractor must, within 3 working days after treatment, complete and mail to the employer, the employer's insurer and third-party , the Claim for Compensation.    Medical Treatment: If you require medical treatment for your on-the-job injury or OD, you may be required to select a physician or chiropractor from a list provided by your workers’ compensation insurer, if it has contracted with an Organization for Managed Care (MCO) or Preferred Provider Organization (PPO) or providers of health care. If your employer has not entered into a contract with an MCO or PPO, you may select a physician or chiropractor from the Panel of Physicians and Chiropractors. Any medical costs related to your industrial injury or OD will be paid by your insurer.    Temporary Total Disability (TTD): If your doctor has certified that " you are unable to work for a period of at least 5 consecutive days, or 5 cumulative days in a 20-day period, or places restrictions on you that your employer does not accommodate, you may be entitled to TTD compensation.    Temporary Partial Disability (TPD): If the wage you receive upon reemployment is less than the compensation for TTD to which you are entitled, the insurer may be required to pay you TPD compensation to make up the difference. TPD can only be paid for a maximum of 24 months.    Permanent Partial Disability (PPD): When your medical condition is stable and there is an indication of a PPD as a result of your injury or OD, within 30 days, your insurer must arrange for an evaluation by a rating physician or chiropractor to determine the degree of your PPD. The amount of your PPD award depends on the date of injury, the results of the PPD evaluation and your age and wage.    Permanent Total Disability (PTD): If you are medically certified by a treating physician or chiropractor as permanently and totally disabled and have been granted a PTD status by your insurer, you are entitled to receive monthly benefits not to exceed 66 2/3% of your average monthly wage. The amount of your PTD payments is subject to reduction if you previously received a PPD award.    Vocational Rehabilitation Services: You may be eligible for vocational rehabilitation services if you are unable to return to the job due to a permanent physical impairment or permanent restrictions as a result of your injury or occupational disease.    Transportation and Per Magui Reimbursement: You may be eligible for travel expenses and per magui associated with medical treatment.    Reopening: You may be able to reopen your claim if your condition worsens after claim closure.     Appeal Process: If you disagree with a written determination issued by the insurer or the insurer does not respond to your request, you may appeal to the Department of  Administration, , by following the instructions contained in your determination letter. You must appeal the determination within 70 days from the date of the determination letter at [address], [address], [place], [place] [address], or [address], [place], [place] [address]. If you disagree with the  decision, you may appeal to the Department of Administration, . You must file your appeal within 30 days from the date of the  decision letter at [address], [address], [place], [place] [address], or [address], [place], [place] [address]. If you disagree with a decision of an , you may file a petition for judicial review with the District Court. You must do so within 30 days of the Appeal Officer’s decision. You may be represented by an  at your own expense or you may contact the [facility] for possible representation.    [place]  for In[place] Workers ([place]IW): If you disagree with a  decision, you may request that NAIW represent you without charge at an  Hearing. For information regarding denial of benefits, you may contact the [facility] at: [address], [address], [place] [address], [phone], or [address], [place] [address], [phone]    To File a Complaint with the Division: If you wish to file a complaint with the  of the Division of Industrial Relations (DIR),  please contact the Workers’ Compensation Section, [address], [address], [place], [place] [address], telephone ([phone]) [phone], or [address], [address], [place], [place] [address], telephone [phone].    For assistance with Workers’ Compensation Issues: You may contact the Office of the Governor Consumer Health Assistance, [address], [place], [place] [address], Toll Free 1-629.406.4478,  Web site: http://saurav..[place]., E-mail jorden@[email or web address].  D-2 (rev. 06/18)              __________________________________________________________________                                    _________________            Employee Name / Signature                                                                                                                            Date

## 2020-06-09 NOTE — LETTER
FORM C-4:  EMPLOYEE’S CLAIM FOR COMPENSATION/ REPORT OF INITIAL TREATMENT  EMPLOYEE’S CLAIM - PROVIDE ALL INFORMATION REQUESTED   First Name Luisa Last Name Zeyad Birthdate 1980  Sex female Claim Number   Home Employee Address [address]  [facility]                                     Zip  [address] Height  1.524 m (5') Weight  117.9 kg (260 lb) [facility]  [id number]  [id number]   Mailing Employee Address [address]   [facility]               Zip  [address] Telephone  [phone] (home)  Primary Language Spoken   Insurer Third Party   [employer] [facility] Employee's Occupation (Job Title) When Injury or Occupational Disease Occurred     Employer's Name [employer] Telephone [phone]    Employer Address [address] [facility] [address]] Zip [address]   Date of Injury  6/9/2020       Hour of Injury  12:00 PM Date Employer Notified  6/9/2020 Last Day of Work after Injury or Occupational Disease  6/9/2020 Supervisor to Whom Injury Reported  Jaziel   Address or Location of Accident (if applicable) [N/A]   What were you doing at the time of accident? (if applicable) Dusting a nordo-truck workout machine    How did this injury or occupational disease occur? Be specific and answer in detail. Use additional sheet if necessary)  When I was cleaning and dusting items in home and when I stepped off my foot twisted and cracked   If you believe that you have an occupational disease, when did you first have knowledge of the disability and it relationship to your employment? N/A Witnesses to the Accident  N/A   Nature of Injury or Occupational Disease  Workers' Compensation Part(s) of Body Injured or Affected  Foot (R), Ankle (R), Soft Tissue    I CERTIFY THAT THE ABOVE IS TRUE AND CORRECT TO THE BEST OF MY KNOWLEDGE AND THAT I HAVE PROVIDED THIS INFORMATION IN ORDER TO OBTAIN THE BENEFITS OF [place] AND OCCUPATIONAL DISEAS[place]  [place] IN[place]IVE OR CHAPTER [place] OF [place]).  I HEREBY AUTHORIZE ANY PHYSICIAN, CHIROPRACTOR, SURGEON, PRACTITIONER, OR OTHER PERSON, ANY HOSPITAL, INCLUDING [facility] OR [facility] H[facility]ITAL, ANY MEDICAL SERVICE ORGANIZATION, ANY INSURANCE COMPANY, OR OTHER INSTITUTION OR ORGANIZATION TO RELEASE TO EACH OTHER, ANY MEDICAL OR OTHER INFORMATION, INCLUDING BENEFITS PAID OR PAYABLE, PERTINENT TO THIS INJURY OR DISEASE, EXCEPT INFORMATION RELATIVE TO DIAGNOSIS, TREATMENT AND/OR COUNSELING FOR AIDS, PSYCHOLOGICAL CONDITIONS, ALCOHOL OR CONTROLLED SUBSTANCES, FOR WHICH I MUST GIVE SPECIFIC AUTHORIZATION.  A PHOTOSTAT OF THIS AUTHORIZATION SHALL BE AS VALID AS THE ORIGINAL.  Date   06/09/2020                                   Place  [facility]                                                               Employee’s Signature   THIS REPORT MUST BE COMPLETED AND MAILED WITHIN 3 WORKING DAYS OF TREATMENT   Place [facility], EMERGENCY DEPT                                                                             Name of Facility [facility]   Date  6/9/2020 Diagnosis  (S93.601A) Sprain of right foot, initial encounter Is there evidence the injured employee was under the influence of alcohol and/or another controlled substance at the time of accident?   Hour  2:38 PM Description of Injury or Disease  Sprain of right foot, initial encounter No   Treatment  X-ray, Motrin,  Have you advised the patient to remain off work five days or more?         No   X-Ray Findings  Negative If Yes   From Date    To Date      From information given by the employee, together with medical evidence, can you directly connect this injury or occupational disease as job incurred? Yes If No, is employee capable of: Full Duty  No Modified Duty  Yes   Is additional medical care by a physician indicated? Yes If Modified Duty, Specify any Limitations / Restrictions   Must remain in walking boot  "until evaluated by occupational medicine   Do you know of any previous injury or disease contributing to this condition or occupational disease? No    Date 6/9/2020 Print Doctor’s Name Cecil Rosales certify the employer’s copy of this form was mailed on:   Address [address] [place] [address]  STEVE ODEN [id number] [phone] INSURER’S USE ONLY   Provider’s Tax ID Number [id number] Telephone Dept: [phone]    Doctor’s Signature e-SignCECIL ROSALES M.D. Degree  MD      Form C-4 (rev.10/07)                                                                         BRIEF DESCRIPTION OF RIGHTS AND BENEFITS  (Pursuant to [place] 616C.050)    Notice of Injury or Occupational Disease (Incident Report Form C-1): If an injury or occupational disease (OD) arises out of and in the course of employment, you must provide written notice to your employer as soon as practicable, but no later than 7 days after the accident or OD. Your employer shall maintain a sufficient supply of the required forms.    Claim for Compensation (Form C-4): If medical treatment is sought, the form C-4 is available at the place of initial treatment. A completed \"Claim for Compensation\" (Form C-4) must be filed within 90 days after an accident or OD. The treating physician or chiropractor must, within 3 working days after treatment, complete and mail to the employer, the employer's insurer and third-party , the Claim for Compensation.    Medical Treatment: If you require medical treatment for your on-the-job injury or OD, you may be required to select a physician or chiropractor from a list provided by your workers’ compensation insurer, if it has contracted with an Organization for Managed Care (MCO) or Preferred Provider Organization (PPO) or providers of health care. If your employer has not entered into a contract with an MCO or PPO, you may select a physician or chiropractor from the Panel of Physicians and Chiropractors. Any medical costs " related to your industrial injury or OD will be paid by your insurer.    Temporary Total Disability (TTD): If your doctor has certified that you are unable to work for a period of at least 5 consecutive days, or 5 cumulative days in a 20-day period, or places restrictions on you that your employer does not accommodate, you may be entitled to TTD compensation.    Temporary Partial Disability (TPD): If the wage you receive upon reemployment is less than the compensation for TTD to which you are entitled, the insurer may be required to pay you TPD compensation to make up the difference. TPD can only be paid for a maximum of 24 months.    Permanent Partial Disability (PPD): When your medical condition is stable and there is an indication of a PPD as a result of your injury or OD, within 30 days, your insurer must arrange for an evaluation by a rating physician or chiropractor to determine the degree of your PPD. The amount of your PPD award depends on the date of injury, the results of the PPD evaluation and your age and wage.    Permanent Total Disability (PTD): If you are medically certified by a treating physician or chiropractor as permanently and totally disabled and have been granted a PTD status by your insurer, you are entitled to receive monthly benefits not to exceed 66 2/3% of your average monthly wage. The amount of your PTD payments is subject to reduction if you previously received a PPD award.    Vocational Rehabilitation Services: You may be eligible for vocational rehabilitation services if you are unable to return to the job due to a permanent physical impairment or permanent restrictions as a result of your injury or occupational disease.    Transportation and Per Magui Reimbursement: You may be eligible for travel expenses and per magui associated with medical treatment.    Reopening: You may be able to reopen your claim if your condition worsens after claim closure.     Appeal Process: If you disagree  with a written determination issued by the insurer or the insurer does not respond to your request, you may appeal to the Department of Administration, , by following the instructions contained in your determination letter. You must appeal the determination within 70 days from the date of the determination letter at [address], [address], [place], [place] [address], or [address], [address], [place], [place] [address]. If you disagree with the  decision, you may appeal to the Department of Administration, . You must file your appeal within 30 days from the date of the  decision letter at [address], [address], [place], [place] [address], or [address], [address], [place], [place] [address]. If you disagree with a decision of an , you may file a petition for judicial review with the District Court. You must do so within 30 days of the Appeal Officer’s decision. You may be represented by an  at your own expense or you may contact the [facility] for possible representation.    [place]  for Injured Workers (NAIW): If you disagree with a  decision, you may request that NAIW represent you without charge at an  Hearing. For information regarding denial of benefits, you may contact the [facility] at: [address], [address], [place] [address], [phone], or [address], [address], [place] [address], [phone]    To File a Complaint with the Division: If you wish to file a complaint with the  of the Division of Industrial Relations (DIR),  please contact the Workers’ Compensation Section, [address] [address], [place], [place] [address], telephone [phone], or [address], [address], [place], [place] [address], telephone [phone].    For assistance with Workers’ Compensation Issues: You may contact the  Office of the Governor Consumer Health Assistance, [address] [place] [address], [address], [place] [address], Toll Free 1-749.929.8105, Web site: http://govWVUMedicine [email or web address]., E-mail [email or web address].  D-2 (rev. 06/18)              __________________________________________________________________                                    _________________            Employee Name / Signature                                                                                                                            Date

## 2020-06-09 NOTE — ED TRIAGE NOTES
"Chief Complaint   Patient presents with   • Foot Pain     pt stepped off of a machine and felt her RT foot \"crack\", approx 1 hr ago      Pt to triage via w/c with family for above. CMS intact. NAD noted.     Denies cough, fever, travel or contact with covid.    /96   Pulse (!) 103   Temp 37.1 °C (98.8 °F) (Temporal)   Resp 15   Ht 1.524 m (5')   Wt 117.9 kg (260 lb)   SpO2 96%   BMI 50.78 kg/m²     "

## 2020-06-09 NOTE — ED PROVIDER NOTES
"ED Provider Note    CHIEF COMPLAINT  Chief Complaint   Patient presents with   • Foot Pain     pt stepped off of a machine and felt her RT foot \"crack\", approx 1 hr ago        HPI  Luisa Jeffers is a 39 y.o. female who presents for evaluation of a right foot injury sustained while at work, she stepped down off of something and states that she felt immediate pain and a 3 stage cracking-like sensation involving the lateral aspect of the right foot.  She does not have any decreased sensation or weakness, no pain above the foot, no knee or hip pain or back pain.  She reports a medical history including diabetes and scoliosis.  No other acute symptoms.    REVIEW OF SYSTEMS  Negative for fever, new weakness, new numbness.    PAST MEDICAL HISTORY   has a past medical history of Bilateral ovarian cysts, Depression, Diabetes (HCC), Scoliosis, and Spinal stenosis.    SOCIAL HISTORY  Social History     Tobacco Use   • Smoking status: Former Smoker   • Smokeless tobacco: Never Used   • Tobacco comment: 2 years ago   Substance and Sexual Activity   • Alcohol use: No   • Drug use: No   • Sexual activity: Not on file       SURGICAL HISTORY   has a past surgical history that includes other and gyn surgery.    CURRENT MEDICATIONS  I personally reviewed the medication list in the charting documentation.     ALLERGIES  No Known Allergies    PHYSICAL EXAM  VITAL SIGNS: /96   Pulse (!) 103   Temp 37.1 °C (98.8 °F) (Temporal)   Resp 15   Ht 1.524 m (5')   Wt 117.9 kg (260 lb)   SpO2 96%   BMI 50.78 kg/m²   Constitutional: Alert in no apparent distress.  HENT: No signs of trauma.   Eyes: Conjunctiva normal, Non-icteric.   Chest: Normal nonlabored respirations  Skin: No erythema, No rash.   Musculoskeletal: Moderate amount of edema with some ecchymosis overlying the lateral aspect of the midfoot, tender on palpation.  Normal dorsalis pedis pulse, normal sensation distally.  Limited range of motion of the foot and ankle " secondary to pain.  Neurologic: Alert, No focal deficits noted.   Psychiatric: Affect normal, Judgment normal.    DIAGNOSTIC STUDIES / PROCEDURES    RADIOLOGY  DX-FOOT-COMPLETE 3+ RIGHT   Final Result      No evidence of acute fracture or dislocation.            COURSE & MEDICAL DECISION MAKING  Pertinent Labs & Imaging studies reviewed. (See chart for details)    Encounter Summary: This is a 39 y.o. female with a right foot injury sustained at work, no focal neurologic complaints or findings on exam, no obvious osseous injury identified on the x-ray.  She will be placed in a walking boot and will follow-up with occupational medicine for further evaluation.  She will be treated here with Motrin.  Strict return instructions discussed.      DISPOSITION: Discharge Home      FINAL IMPRESSION  1. Sprain of right foot, initial encounter        This dictation was created using voice recognition software. The accuracy of the dictation is limited to the abilities of the software. I expect there may be some errors of grammar and possibly content. The nursing notes were reviewed and certain aspects of this information were incorporated into this note.    Electronically signed by: Cecil Rosales M.D., 6/9/2020 2:18 PM

## 2021-04-30 ENCOUNTER — APPOINTMENT (OUTPATIENT)
Dept: RADIOLOGY | Facility: MEDICAL CENTER | Age: 41
End: 2021-04-30
Attending: EMERGENCY MEDICINE
Payer: MEDICAID

## 2021-04-30 ENCOUNTER — HOSPITAL ENCOUNTER (EMERGENCY)
Facility: MEDICAL CENTER | Age: 41
End: 2021-04-30
Attending: EMERGENCY MEDICINE
Payer: MEDICAID

## 2021-04-30 VITALS
BODY MASS INDEX: 49.77 KG/M2 | HEIGHT: 60 IN | HEART RATE: 77 BPM | RESPIRATION RATE: 20 BRPM | TEMPERATURE: 96.4 F | SYSTOLIC BLOOD PRESSURE: 119 MMHG | OXYGEN SATURATION: 98 % | WEIGHT: 253.53 LBS | DIASTOLIC BLOOD PRESSURE: 58 MMHG

## 2021-04-30 DIAGNOSIS — N12 PYELONEPHRITIS: ICD-10-CM

## 2021-04-30 LAB
ALBUMIN SERPL BCP-MCNC: 4.1 G/DL (ref 3.2–4.9)
ALBUMIN/GLOB SERPL: 1.1 G/DL
ALP SERPL-CCNC: 106 U/L (ref 30–99)
ALT SERPL-CCNC: 21 U/L (ref 2–50)
ANION GAP SERPL CALC-SCNC: 8 MMOL/L (ref 7–16)
APPEARANCE UR: ABNORMAL
AST SERPL-CCNC: 13 U/L (ref 12–45)
BACTERIA #/AREA URNS HPF: ABNORMAL /HPF
BASOPHILS # BLD AUTO: 0.5 % (ref 0–1.8)
BASOPHILS # BLD: 0.05 K/UL (ref 0–0.12)
BILIRUB SERPL-MCNC: 0.3 MG/DL (ref 0.1–1.5)
BILIRUB UR QL STRIP.AUTO: NEGATIVE
BUN SERPL-MCNC: 13 MG/DL (ref 8–22)
CALCIUM SERPL-MCNC: 9.3 MG/DL (ref 8.5–10.5)
CHLORIDE SERPL-SCNC: 101 MMOL/L (ref 96–112)
CO2 SERPL-SCNC: 27 MMOL/L (ref 20–33)
COLOR UR: ABNORMAL
CREAT SERPL-MCNC: 0.64 MG/DL (ref 0.5–1.4)
EOSINOPHIL # BLD AUTO: 0.11 K/UL (ref 0–0.51)
EOSINOPHIL NFR BLD: 1.1 % (ref 0–6.9)
EPI CELLS #/AREA URNS HPF: ABNORMAL /HPF
ERYTHROCYTE [DISTWIDTH] IN BLOOD BY AUTOMATED COUNT: 45 FL (ref 35.9–50)
GLOBULIN SER CALC-MCNC: 3.8 G/DL (ref 1.9–3.5)
GLUCOSE SERPL-MCNC: 115 MG/DL (ref 65–99)
GLUCOSE UR STRIP.AUTO-MCNC: >=1000 MG/DL
HCG SERPL QL: NEGATIVE
HCT VFR BLD AUTO: 44.9 % (ref 37–47)
HGB BLD-MCNC: 14.1 G/DL (ref 12–16)
IMM GRANULOCYTES # BLD AUTO: 0.04 K/UL (ref 0–0.11)
IMM GRANULOCYTES NFR BLD AUTO: 0.4 % (ref 0–0.9)
KETONES UR STRIP.AUTO-MCNC: ABNORMAL MG/DL
LEUKOCYTE ESTERASE UR QL STRIP.AUTO: ABNORMAL
LYMPHOCYTES # BLD AUTO: 2.82 K/UL (ref 1–4.8)
LYMPHOCYTES NFR BLD: 27.1 % (ref 22–41)
MCH RBC QN AUTO: 26 PG (ref 27–33)
MCHC RBC AUTO-ENTMCNC: 31.4 G/DL (ref 33.6–35)
MCV RBC AUTO: 82.7 FL (ref 81.4–97.8)
MICRO URNS: ABNORMAL
MONOCYTES # BLD AUTO: 0.59 K/UL (ref 0–0.85)
MONOCYTES NFR BLD AUTO: 5.7 % (ref 0–13.4)
NEUTROPHILS # BLD AUTO: 6.78 K/UL (ref 2–7.15)
NEUTROPHILS NFR BLD: 65.2 % (ref 44–72)
NITRITE UR QL STRIP.AUTO: POSITIVE
NRBC # BLD AUTO: 0 K/UL
NRBC BLD-RTO: 0 /100 WBC
PH UR STRIP.AUTO: 5 [PH] (ref 5–8)
PLATELET # BLD AUTO: 280 K/UL (ref 164–446)
PMV BLD AUTO: 10.4 FL (ref 9–12.9)
POTASSIUM SERPL-SCNC: 4 MMOL/L (ref 3.6–5.5)
PROT SERPL-MCNC: 7.9 G/DL (ref 6–8.2)
PROT UR QL STRIP: >=300 MG/DL
RBC # BLD AUTO: 5.43 M/UL (ref 4.2–5.4)
RBC # URNS HPF: >150 /HPF
RBC UR QL AUTO: ABNORMAL
SODIUM SERPL-SCNC: 136 MMOL/L (ref 135–145)
SP GR UR STRIP.AUTO: 1.02
UROBILINOGEN UR STRIP.AUTO-MCNC: 1 MG/DL
WBC # BLD AUTO: 10.4 K/UL (ref 4.8–10.8)
WBC #/AREA URNS HPF: ABNORMAL /HPF

## 2021-04-30 PROCEDURE — 99284 EMERGENCY DEPT VISIT MOD MDM: CPT

## 2021-04-30 PROCEDURE — 87186 SC STD MICRODIL/AGAR DIL: CPT

## 2021-04-30 PROCEDURE — 81001 URINALYSIS AUTO W/SCOPE: CPT

## 2021-04-30 PROCEDURE — 85025 COMPLETE CBC W/AUTO DIFF WBC: CPT

## 2021-04-30 PROCEDURE — 700102 HCHG RX REV CODE 250 W/ 637 OVERRIDE(OP): Performed by: EMERGENCY MEDICINE

## 2021-04-30 PROCEDURE — 96375 TX/PRO/DX INJ NEW DRUG ADDON: CPT

## 2021-04-30 PROCEDURE — 700105 HCHG RX REV CODE 258: Performed by: EMERGENCY MEDICINE

## 2021-04-30 PROCEDURE — 87077 CULTURE AEROBIC IDENTIFY: CPT | Mod: 91

## 2021-04-30 PROCEDURE — 96365 THER/PROPH/DIAG IV INF INIT: CPT

## 2021-04-30 PROCEDURE — 74176 CT ABD & PELVIS W/O CONTRAST: CPT

## 2021-04-30 PROCEDURE — 87086 URINE CULTURE/COLONY COUNT: CPT

## 2021-04-30 PROCEDURE — A9270 NON-COVERED ITEM OR SERVICE: HCPCS | Performed by: EMERGENCY MEDICINE

## 2021-04-30 PROCEDURE — 36415 COLL VENOUS BLD VENIPUNCTURE: CPT

## 2021-04-30 PROCEDURE — 700111 HCHG RX REV CODE 636 W/ 250 OVERRIDE (IP): Performed by: EMERGENCY MEDICINE

## 2021-04-30 PROCEDURE — 84703 CHORIONIC GONADOTROPIN ASSAY: CPT

## 2021-04-30 PROCEDURE — 80053 COMPREHEN METABOLIC PANEL: CPT

## 2021-04-30 RX ORDER — PHENAZOPYRIDINE HYDROCHLORIDE 200 MG/1
200 TABLET, FILM COATED ORAL ONCE
Status: COMPLETED | OUTPATIENT
Start: 2021-04-30 | End: 2021-04-30

## 2021-04-30 RX ORDER — KETOROLAC TROMETHAMINE 30 MG/ML
30 INJECTION, SOLUTION INTRAMUSCULAR; INTRAVENOUS ONCE
Status: COMPLETED | OUTPATIENT
Start: 2021-04-30 | End: 2021-04-30

## 2021-04-30 RX ORDER — PHENAZOPYRIDINE HYDROCHLORIDE 200 MG/1
200 TABLET, FILM COATED ORAL 3 TIMES DAILY PRN
Qty: 6 TABLET | Refills: 0 | Status: SHIPPED | OUTPATIENT
Start: 2021-04-30 | End: 2021-05-03

## 2021-04-30 RX ORDER — SULFAMETHOXAZOLE AND TRIMETHOPRIM 800; 160 MG/1; MG/1
1 TABLET ORAL 2 TIMES DAILY
Qty: 14 TABLET | Refills: 0 | Status: SHIPPED | OUTPATIENT
Start: 2021-04-30 | End: 2021-05-07

## 2021-04-30 RX ORDER — ONDANSETRON 4 MG/1
4 TABLET, ORALLY DISINTEGRATING ORAL EVERY 8 HOURS PRN
Qty: 20 TABLET | Refills: 0 | Status: SHIPPED | OUTPATIENT
Start: 2021-04-30 | End: 2021-06-19 | Stop reason: SDUPTHER

## 2021-04-30 RX ADMIN — CEFTRIAXONE SODIUM 2 G: 2 INJECTION, POWDER, FOR SOLUTION INTRAMUSCULAR; INTRAVENOUS at 13:32

## 2021-04-30 RX ADMIN — KETOROLAC TROMETHAMINE 30 MG: 30 INJECTION, SOLUTION INTRAMUSCULAR; INTRAVENOUS at 12:03

## 2021-04-30 RX ADMIN — PHENAZOPYRIDINE HYDROCHLORIDE 200 MG: 200 TABLET ORAL at 10:47

## 2021-04-30 ASSESSMENT — FIBROSIS 4 INDEX: FIB4 SCORE: 0.48

## 2021-04-30 NOTE — ED PROVIDER NOTES
"ED Provider Note    Scribed for Renan Gloria M.D. by Mo Covington. 4/30/2021  10:21 AM    Primary care provider: Jadiel Ignacio M.D.  Means of arrival: Walk-in  History obtained from: Patient  History limited by: None    CHIEF COMPLAINT  Chief Complaint   Patient presents with    Blood in Urine     starting today.     Painful Urination       HPI  Luisa Jeffers is a 40 y.o. female who presents to the Emergency Department for evaluation of acute dysuria onset this morning. She is very uncomfortable and is passing \"pure blood\" in her urine. Over the last few days she has been having urinary frequency, and was also feeling dizzy and nauseous. She has not had any fevers. There are no known alleviating or exacerbating factors.     REVIEW OF SYSTEMS  Pertinent positives include dysuria, hematuria, urinary frequency, dizziness, and nausea. Pertinent negatives include no fever.  All other systems reviewed and negative.    PAST MEDICAL HISTORY   has a past medical history of Bilateral ovarian cysts, Depression, Diabetes (HCC), Scoliosis, and Spinal stenosis.    SURGICAL HISTORY   has a past surgical history that includes other; gyn surgery; and tubal ligation.    SOCIAL HISTORY  Social History     Tobacco Use    Smoking status: Former Smoker    Smokeless tobacco: Never Used    Tobacco comment: 2 years ago   Substance Use Topics    Alcohol use: No    Drug use: No      Social History     Substance and Sexual Activity   Drug Use No       FAMILY HISTORY  History reviewed. No pertinent family history.    CURRENT MEDICATIONS  Current Outpatient Medications   Medication Instructions    Buprenorphine HCl-Naloxone HCl (SUBOXONE SL) Sublingual    cloNIDine (CATAPRES) 0.1 mg, Oral, 2 TIMES DAILY    Dapagliflozin Propanediol (FARXIGA) 5 MG Tab Oral    diazePAM (VALIUM) 2.5-5 mg, Oral, EVERY 6 HOURS PRN    Dulaglutide (TRULICITY SC) Subcutaneous, EVERY 7 DAYS    DULoxetine (CYMBALTA) 60 mg, Oral, DAILY    ibuprofen (MOTRIN) " 600 mg, Oral, EVERY 6 HOURS PRN    methocarbamol (ROBAXIN) 500 mg, Oral, EVERY 6 HOURS PRN       ALLERGIES  No Known Allergies    PHYSICAL EXAM  VITAL SIGNS: BP (!) 179/124   Pulse (!) 115   Temp 36.8 °C (98.3 °F) (Temporal)   Resp 20   Ht 1.524 m (5')   Wt 115 kg (253 lb 8.5 oz)   SpO2 96%   BMI 49.51 kg/m²     Constitutional: Well developed, Well nourished, moderate to severe distress, Non-toxic appearance.   HENT: Normocephalic, Atraumatic, Bilateral external ears normal, Oropharynx moist, No oral exudates.   Eyes: PERRLA, EOMI, Conjunctiva normal, No discharge.   Neck: No tenderness, Supple, No stridor.   Lymphatic: No lymphadenopathy noted.   Cardiovascular: Normal heart rate, Normal rhythm.   Thorax & Lungs: Clear to auscultation bilaterally, No respiratory distress, No wheezing, No crackles.   Abdomen: Soft, Pyelonephritis, UTI, kidney stone. No masses, No pulsatile masses.   Skin: Warm, Dry, No erythema, No rash.   Extremities:, No edema No cyanosis.   Musculoskeletal: No tenderness to palpation or major deformities noted.  Intact distal pulses  Neurologic: Awake, alert. Moves all extremities spontaneously.  Psychiatric: Affect normal, Judgment normal, Mood normal.     LABS  Results for orders placed or performed during the hospital encounter of 04/30/21   URINALYSIS,CULTURE IF INDICATED    Specimen: Urine, Clean Catch   Result Value Ref Range    Color Red (A)     Character Turbid (A)     Specific Gravity 1.025 <1.035    Ph 5.0 5.0 - 8.0    Glucose >=1000 (A) Negative mg/dL    Ketones Trace (A) Negative mg/dL    Protein >=300 (A) Negative mg/dL    Bilirubin Negative Negative    Urobilinogen, Urine 1.0 Negative    Nitrite Positive (A) Negative    Leukocyte Esterase Small (A) Negative    Occult Blood Large (A) Negative    Micro Urine Req Microscopic    HCG Qual Serum   Result Value Ref Range    Beta-Hcg Qualitative Serum Negative Negative   CBC WITH DIFFERENTIAL   Result Value Ref Range    WBC 10.4 4.8  - 10.8 K/uL    RBC 5.43 (H) 4.20 - 5.40 M/uL    Hemoglobin 14.1 12.0 - 16.0 g/dL    Hematocrit 44.9 37.0 - 47.0 %    MCV 82.7 81.4 - 97.8 fL    MCH 26.0 (L) 27.0 - 33.0 pg    MCHC 31.4 (L) 33.6 - 35.0 g/dL    RDW 45.0 35.9 - 50.0 fL    Platelet Count 280 164 - 446 K/uL    MPV 10.4 9.0 - 12.9 fL    Neutrophils-Polys 65.20 44.00 - 72.00 %    Lymphocytes 27.10 22.00 - 41.00 %    Monocytes 5.70 0.00 - 13.40 %    Eosinophils 1.10 0.00 - 6.90 %    Basophils 0.50 0.00 - 1.80 %    Immature Granulocytes 0.40 0.00 - 0.90 %    Nucleated RBC 0.00 /100 WBC    Neutrophils (Absolute) 6.78 2.00 - 7.15 K/uL    Lymphs (Absolute) 2.82 1.00 - 4.80 K/uL    Monos (Absolute) 0.59 0.00 - 0.85 K/uL    Eos (Absolute) 0.11 0.00 - 0.51 K/uL    Baso (Absolute) 0.05 0.00 - 0.12 K/uL    Immature Granulocytes (abs) 0.04 0.00 - 0.11 K/uL    NRBC (Absolute) 0.00 K/uL   COMP METABOLIC PANEL   Result Value Ref Range    Sodium 136 135 - 145 mmol/L    Potassium 4.0 3.6 - 5.5 mmol/L    Chloride 101 96 - 112 mmol/L    Co2 27 20 - 33 mmol/L    Anion Gap 8.0 7.0 - 16.0    Glucose 115 (H) 65 - 99 mg/dL    Bun 13 8 - 22 mg/dL    Creatinine 0.64 0.50 - 1.40 mg/dL    Calcium 9.3 8.5 - 10.5 mg/dL    AST(SGOT) 13 12 - 45 U/L    ALT(SGPT) 21 2 - 50 U/L    Alkaline Phosphatase 106 (H) 30 - 99 U/L    Total Bilirubin 0.3 0.1 - 1.5 mg/dL    Albumin 4.1 3.2 - 4.9 g/dL    Total Protein 7.9 6.0 - 8.2 g/dL    Globulin 3.8 (H) 1.9 - 3.5 g/dL    A-G Ratio 1.1 g/dL   URINE MICROSCOPIC (W/UA)   Result Value Ref Range    WBC 10-20 (A) /hpf    RBC >150 (A) /hpf    Bacteria Few (A) None /hpf    Epithelial Cells Moderate (A) /hpf   ESTIMATED GFR   Result Value Ref Range    GFR If African American >60 >60 mL/min/1.73 m 2    GFR If Non African American >60 >60 mL/min/1.73 m 2   URINE CULTURE(NEW)    Specimen: Urine   Result Value Ref Range    Significant Indicator NEG     Source UR     Site -     Culture Result -         RADIOLOGY  CT-RENAL COLIC EVALUATION(A/P W/O)   Final Result          1.  No urolithiasis or hydronephrosis.   2.  Mild hepatomegaly.                    The radiologist's interpretation of all radiological studies have been reviewed by me.      COURSE & MEDICAL DECISION MAKING  Pertinent Labs & Imaging studies reviewed. (See chart for details)    I reviewed the patient's medical records which showed that she has a history of diabetes, ovarian cysts, and opiate dependence.     10:21 AM - Patient seen and examined at bedside. Patient will be treated with Toradol and Pyridium. Ordered CT-renal colic, HCG quak serum, CBC with differential, CMP, and UA to evaluate her symptoms. The differential diagnoses include but are not limited to: Pyelonephritis, UTI, kidney stone    12:55 PM - Patient was reevaluated at bedside. Treated with Rocephin. Discussed lab and radiology results with the patient and informed them that she will be prescribed antibiotics. Discussed discharge instructions and return precautions with the patient and they were cleared for discharge. Patient was given the opportunity to ask any further questions. She is comfortable with discharge at this time.        Decision Making:  Patient with hematuria and dysuria, flank pain, diabetic, CT scan was negative, the patient is feeling improved after Toradol and Pyridium.  Give the patient Rocephin, put the patient on Bactrim, have the patient return to the next 1 to 2 days if not improved, return sooner with worsening symptoms.    The patient will return for new or worsening symptoms and is stable at the time of discharge.    The patient is referred to a primary physician for blood pressure management, diabetic screening, and for all other preventative health concerns.    DISPOSITION:  Patient will be discharged home in stable condition.    FOLLOW UP:  [facility], Emergency Dept  [address] [address] [phone]    If symptoms worsen      OUTPATIENT MEDICATIONS:  Discharge  Medication List as of 4/30/2021  1:54 PM        START taking these medications    Details   phenazopyridine (PYRIDIUM) 200 MG Tab Take 1 tablet by mouth 3 times a day as needed for Mild Pain for up to 3 days., Disp-6 tablet, R-0, Normal      ondansetron (ZOFRAN ODT) 4 MG TABLET DISPERSIBLE Take 1 tablet by mouth every 8 hours as needed., Disp-20 tablet, R-0, Normal      sulfamethoxazole-trimethoprim (BACTRIM DS) 800-160 MG tablet Take 1 tablet by mouth 2 times a day for 7 days., Disp-14 tablet, R-0, Normal             FINAL IMPRESSION  1. Pyelonephritis          I, Mo Covington (Scribe), am scribing for, and in the presence of, Renan Gloria M.D..    Electronically signed by: Mo Covington (Scribe), 4/30/2021    IRenan M.D. personally performed the services described in this documentation, as scribed by Mo Covington in my presence, and it is both accurate and complete.    The note accurately reflects work and decisions made by me.  Renan Gloria M.D.  4/30/2021  3:59 PM

## 2021-04-30 NOTE — ED TRIAGE NOTES
..  Chief Complaint   Patient presents with   • Blood in Urine     starting today.    • Painful Urination       .BP (!) 179/124   Pulse (!) 115   Temp 36.8 °C (98.3 °F) (Temporal)   Resp 20   Ht 1.524 m (5')   Wt 115 kg (253 lb 8.5 oz)   SpO2 96%        Explained triage process, to waiting room. Asked to inform RN if questions or concerns arise.

## 2021-04-30 NOTE — ED NOTES
Discharge instructions given.  All questions answered.  Prescriptions given x3.  Pt to follow-up with PCP, return to ER if symptoms worsen as discussed.  Pt verbalized understanding.  All belongings with pt.  Pt ambulated to [facility].

## 2021-05-02 LAB
BACTERIA UR CULT: ABNORMAL
BACTERIA UR CULT: ABNORMAL
SIGNIFICANT IND 70042: ABNORMAL
SITE SITE: ABNORMAL
SOURCE SOURCE: ABNORMAL

## 2021-05-02 RX ORDER — CIPROFLOXACIN 500 MG/1
500 TABLET, FILM COATED ORAL 2 TIMES DAILY
Qty: 14 TABLET | Refills: 0 | Status: SHIPPED | OUTPATIENT
Start: 2021-05-02 | End: 2021-05-09

## 2021-05-02 NOTE — ED NOTES
ED Positive Culture Follow-up/Notification Note:   Date: 5/2/2021    Patient seen in the ED on 4/30/2021 for dysuria and hematuria  .   1. Pyelonephritis      Discharge Medication List as of 4/30/2021  1:54 PM      START taking these medications    Details   phenazopyridine (PYRIDIUM) 200 MG Tab Take 1 tablet by mouth 3 times a day as needed for Mild Pain for up to 3 days., Disp-6 tablet, R-0, Normal      ondansetron (ZOFRAN ODT) 4 MG TABLET DISPERSIBLE Take 1 tablet by mouth every 8 hours as needed., Disp-20 tablet, R-0, Normal      sulfamethoxazole-trimethoprim (BACTRIM DS) 800-160 MG tablet Take 1 tablet by mouth 2 times a day for 7 days., Disp-14 tablet, R-0, Normal           Ceftriaxone 2g IV x1 given in ED     Allergies: Patient has no known allergies.    Vitals:    04/30/21 1125 04/30/21 1135 04/30/21 1206 04/30/21 1428   BP:   140/65 119/58   Pulse: 78 80 73 77   Resp:    20   Temp:    (!) 35.8 °C (96.4 °F)   TempSrc:    Temporal   SpO2: 97% 96% 98% 98%   Weight:       Height:           Final cultures:   Results     Procedure Component Value Units Date/Time    URINE CULTURE(NEW) [[id number]]  (Abnormal)  (Susceptibility) Collected: 04/30/21 0955    Order Status: Completed Specimen: Urine Updated: 05/02/21 0922     Significant Indicator POS     Source UR     Site -     Culture Result Usual skin marco 10-50,000 cfu/mL      Escherichia coli  >100,000 cfu/mL      Narrative:      Indication for culture:->Patient WITHOUT an indwelling Aguirre  catheter in place with new onset of Dysuria, Frequency,  Urgency, and/or Suprapubic pain    Susceptibility     Escherichia coli (1)     Antibiotic Interpretation Microscan Method Status    Amikacin [*]  Sensitive <=16 mcg/mL TREVOR Final    Ampicillin Resistant >16 mcg/mL TREVOR Final    Amoxicillin/CA [*]  Sensitive <=8/4 mcg/mL TREVOR Final    Aztreonam [*]  Sensitive <=4 mcg/mL TREVOR Final    Ceftolozane+Tazobactam [*]  Sensitive <=2 mcg/mL TREVOR Final    Ceftriaxone Sensitive <=1 mcg/mL  TREVOR Final    Ceftazidime [*]  Sensitive <=1 mcg/mL TREVOR Final    Cefazolin Sensitive <=2 mcg/mL TREVOR Final    Ciprofloxacin Sensitive <=0.25 mcg/mL TREVOR Final    Cefepime Sensitive <=2 mcg/mL TREVOR Final    Cefuroxime Sensitive <=4 mcg/mL TREVOR Final    Ampicillin/sulbactam Intermediate 16/8 mcg/mL TREVOR Final    Ceftazidime+Avibactam [*]  Sensitive <=4 mcg/mL TREVOR Final    Tobramycin Intermediate 8 mcg/mL TREVOR Final    Ertapenem [*]  Sensitive <=0.5 mcg/mL TREVOR Final    Nitrofurantoin Sensitive <=32 mcg/mL TREVOR Final    Gentamicin Resistant >8 mcg/mL TREVOR Final    Imipenem [*]  Sensitive <=1 mcg/mL TREVOR Final    Levofloxacin Sensitive <=0.5 mcg/mL TREVOR Final    Meropenem [*]  Sensitive <=1 mcg/mL TREVOR Final    Meropenem/Vaborbactam [*]  Sensitive <=2 mcg/mL TREVOR Final    Pip/Tazobactam Sensitive <=8 mcg/mL TREVOR Final    Trimeth/Sulfa Resistant >2/38 mcg/mL TREVOR Final    Tetracycline [*]  Sensitive <=4 mcg/mL TREVOR Final    Tigecycline Sensitive <=2 mcg/mL TREVOR Final           [*]   Suppressed Antibiotic                 URINALYSIS,CULTURE IF INDICATED [[id number]]  (Abnormal) Collected: 04/30/21 0955    Order Status: Completed Specimen: Urine, Clean Catch Updated: 04/30/21 1208     Color Red     Character Turbid     Specific Gravity 1.025     Ph 5.0     Glucose >=1000 mg/dL      Ketones Trace mg/dL      Protein >=300 mg/dL      Bilirubin Negative     Urobilinogen, Urine 1.0     Nitrite Positive     Leukocyte Esterase Small     Occult Blood Large     Micro Urine Req Microscopic    Narrative:      Indication for culture:->Patient WITHOUT an indwelling Aguirre  catheter in place with new onset of Dysuria, Frequency,  Urgency, and/or Suprapubic pain          Plan:   Isolated organism is resistant to prescribed therapy. Spoke with patient who denied fever, chills, nausea and vomiting. Patient said she felt some weakness. Informed her if weakness does not improve to seek medical attention. She agreed. Told patient to stop Bactrim will call in  Cipro.   Discussed culture result and change in antibiotics with patient, who will  new prescription at [facility] on [address]    New Rx:  Cipro 500 mg Q12hr x 7 days #14, no refills - MD: Amelia per protocol    Caio Quach, PharmD

## 2021-06-19 ENCOUNTER — HOSPITAL ENCOUNTER (EMERGENCY)
Facility: MEDICAL CENTER | Age: 41
End: 2021-06-19
Attending: EMERGENCY MEDICINE
Payer: MEDICAID

## 2021-06-19 VITALS
HEART RATE: 72 BPM | BODY MASS INDEX: 49.08 KG/M2 | DIASTOLIC BLOOD PRESSURE: 84 MMHG | SYSTOLIC BLOOD PRESSURE: 142 MMHG | OXYGEN SATURATION: 99 % | HEIGHT: 60 IN | WEIGHT: 250 LBS | TEMPERATURE: 97 F | RESPIRATION RATE: 16 BRPM

## 2021-06-19 DIAGNOSIS — R11.2 NAUSEA AND VOMITING, INTRACTABILITY OF VOMITING NOT SPECIFIED, UNSPECIFIED VOMITING TYPE: Primary | ICD-10-CM

## 2021-06-19 DIAGNOSIS — E86.0 DEHYDRATION: ICD-10-CM

## 2021-06-19 LAB
ALBUMIN SERPL BCP-MCNC: 3.9 G/DL (ref 3.2–4.9)
ALBUMIN/GLOB SERPL: 1.1 G/DL
ALP SERPL-CCNC: 101 U/L (ref 30–99)
ALT SERPL-CCNC: 22 U/L (ref 2–50)
ANION GAP SERPL CALC-SCNC: 9 MMOL/L (ref 7–16)
APPEARANCE UR: CLEAR
AST SERPL-CCNC: 23 U/L (ref 12–45)
BASOPHILS # BLD AUTO: 0.3 % (ref 0–1.8)
BASOPHILS # BLD: 0.03 K/UL (ref 0–0.12)
BILIRUB SERPL-MCNC: 0.2 MG/DL (ref 0.1–1.5)
BILIRUB UR QL STRIP.AUTO: NEGATIVE
BUN SERPL-MCNC: 9 MG/DL (ref 8–22)
CALCIUM SERPL-MCNC: 9.1 MG/DL (ref 8.5–10.5)
CHLORIDE SERPL-SCNC: 103 MMOL/L (ref 96–112)
CO2 SERPL-SCNC: 27 MMOL/L (ref 20–33)
COLOR UR: YELLOW
CREAT SERPL-MCNC: 0.65 MG/DL (ref 0.5–1.4)
EOSINOPHIL # BLD AUTO: 0.06 K/UL (ref 0–0.51)
EOSINOPHIL NFR BLD: 0.6 % (ref 0–6.9)
ERYTHROCYTE [DISTWIDTH] IN BLOOD BY AUTOMATED COUNT: 43.2 FL (ref 35.9–50)
GLOBULIN SER CALC-MCNC: 3.6 G/DL (ref 1.9–3.5)
GLUCOSE BLD-MCNC: 80 MG/DL (ref 65–99)
GLUCOSE SERPL-MCNC: 139 MG/DL (ref 65–99)
GLUCOSE UR STRIP.AUTO-MCNC: 100 MG/DL
HCG SERPL QL: NEGATIVE
HCT VFR BLD AUTO: 43.6 % (ref 37–47)
HGB BLD-MCNC: 13.7 G/DL (ref 12–16)
IMM GRANULOCYTES # BLD AUTO: 0.05 K/UL (ref 0–0.11)
IMM GRANULOCYTES NFR BLD AUTO: 0.5 % (ref 0–0.9)
KETONES UR STRIP.AUTO-MCNC: NEGATIVE MG/DL
LEUKOCYTE ESTERASE UR QL STRIP.AUTO: NEGATIVE
LIPASE SERPL-CCNC: 24 U/L (ref 11–82)
LYMPHOCYTES # BLD AUTO: 2.28 K/UL (ref 1–4.8)
LYMPHOCYTES NFR BLD: 21.5 % (ref 22–41)
MCH RBC QN AUTO: 25.9 PG (ref 27–33)
MCHC RBC AUTO-ENTMCNC: 31.4 G/DL (ref 33.6–35)
MCV RBC AUTO: 82.6 FL (ref 81.4–97.8)
MICRO URNS: ABNORMAL
MONOCYTES # BLD AUTO: 0.35 K/UL (ref 0–0.85)
MONOCYTES NFR BLD AUTO: 3.3 % (ref 0–13.4)
NEUTROPHILS # BLD AUTO: 7.82 K/UL (ref 2–7.15)
NEUTROPHILS NFR BLD: 73.8 % (ref 44–72)
NITRITE UR QL STRIP.AUTO: NEGATIVE
NRBC # BLD AUTO: 0 K/UL
NRBC BLD-RTO: 0 /100 WBC
PH UR STRIP.AUTO: 7.5 [PH] (ref 5–8)
PLATELET # BLD AUTO: 260 K/UL (ref 164–446)
PMV BLD AUTO: 10.4 FL (ref 9–12.9)
POTASSIUM SERPL-SCNC: 4 MMOL/L (ref 3.6–5.5)
PROT SERPL-MCNC: 7.5 G/DL (ref 6–8.2)
PROT UR QL STRIP: NEGATIVE MG/DL
RBC # BLD AUTO: 5.28 M/UL (ref 4.2–5.4)
RBC UR QL AUTO: NEGATIVE
SODIUM SERPL-SCNC: 139 MMOL/L (ref 135–145)
SP GR UR STRIP.AUTO: 1.02
UROBILINOGEN UR STRIP.AUTO-MCNC: 0.2 MG/DL
WBC # BLD AUTO: 10.6 K/UL (ref 4.8–10.8)

## 2021-06-19 PROCEDURE — 99284 EMERGENCY DEPT VISIT MOD MDM: CPT

## 2021-06-19 PROCEDURE — 82962 GLUCOSE BLOOD TEST: CPT

## 2021-06-19 PROCEDURE — 700105 HCHG RX REV CODE 258: Performed by: EMERGENCY MEDICINE

## 2021-06-19 PROCEDURE — 85025 COMPLETE CBC W/AUTO DIFF WBC: CPT

## 2021-06-19 PROCEDURE — 96374 THER/PROPH/DIAG INJ IV PUSH: CPT

## 2021-06-19 PROCEDURE — 80053 COMPREHEN METABOLIC PANEL: CPT

## 2021-06-19 PROCEDURE — 84703 CHORIONIC GONADOTROPIN ASSAY: CPT

## 2021-06-19 PROCEDURE — 81003 URINALYSIS AUTO W/O SCOPE: CPT

## 2021-06-19 PROCEDURE — 83690 ASSAY OF LIPASE: CPT

## 2021-06-19 PROCEDURE — 700111 HCHG RX REV CODE 636 W/ 250 OVERRIDE (IP): Performed by: EMERGENCY MEDICINE

## 2021-06-19 RX ORDER — ONDANSETRON 2 MG/ML
4 INJECTION INTRAMUSCULAR; INTRAVENOUS ONCE
Status: COMPLETED | OUTPATIENT
Start: 2021-06-19 | End: 2021-06-19

## 2021-06-19 RX ORDER — DEXTROSE MONOHYDRATE 50 MG/ML
INJECTION, SOLUTION INTRAVENOUS ONCE
Status: COMPLETED | OUTPATIENT
Start: 2021-06-19 | End: 2021-06-19

## 2021-06-19 RX ORDER — ONDANSETRON 4 MG/1
4 TABLET, ORALLY DISINTEGRATING ORAL EVERY 8 HOURS PRN
Qty: 20 TABLET | Refills: 0 | Status: SHIPPED | OUTPATIENT
Start: 2021-06-19

## 2021-06-19 RX ADMIN — DEXTROSE MONOHYDRATE: 50 INJECTION, SOLUTION INTRAVENOUS at 18:30

## 2021-06-19 RX ADMIN — ONDANSETRON 4 MG: 2 INJECTION INTRAMUSCULAR; INTRAVENOUS at 18:30

## 2021-06-19 ASSESSMENT — ENCOUNTER SYMPTOMS
VOMITING: 1
NAUSEA: 1
CHILLS: 1
ABDOMINAL PAIN: 0
DIZZINESS: 1

## 2021-06-19 ASSESSMENT — FIBROSIS 4 INDEX: FIB4 SCORE: 0.38

## 2021-06-19 ASSESSMENT — LIFESTYLE VARIABLES: DO YOU DRINK ALCOHOL: NO

## 2021-06-19 NOTE — ED TRIAGE NOTES
Chief Complaint   Patient presents with   • N/V     for 3 days     Pt wheeled to triage for above complaint. Pt reports she got her covid vaccine on Sunday and then for the last 3 days she hasn't been able to eat anything and has been throwing up. Pt is DM type 2, FSBS 80 in triage. Pt reports increase weakness.

## 2021-06-20 NOTE — ED PROVIDER NOTES
"ED Provider Note    Scribed for MIRNA Thornton II* by Joy Douglas. 6/19/2021  5:33 PM    Means of Arrival: walk in  History obtained by: patient  Limitations: none noted    CHIEF COMPLAINT  Chief Complaint   Patient presents with   • N/V     for 3 days       HPI  Luisa Jeffers is a 40 y.o. female who presents to the Emergency Department with nausea and vomiting onset 3 days ago. She states she received her first covid shot of Moderna on 6/13 and has been endorsing vomiting and dizziness since then. She has a history of diabetes and has a glucose score of 88. She reports she was able to eat a chicken nugget, however, she \"feels my stomach rumbling\".  She also endorses chills, but denies any dysuria or abdominal pain. She denies any rash development in the area where she received the shot.     REVIEW OF SYSTEMS  Review of Systems   Constitutional: Positive for chills.   Gastrointestinal: Positive for nausea and vomiting. Negative for abdominal pain.   Genitourinary: Negative for dysuria.   Skin: Negative for rash.   Neurological: Positive for dizziness.   All other systems reviewed and are negative.    See HPI for further details.    PAST MEDICAL HISTORY   has a past medical history of Bilateral ovarian cysts, Depression, Diabetes (HCC), Scoliosis, and Spinal stenosis.    SOCIAL HISTORY  Social History     Tobacco Use   • Smoking status: Former Smoker   • Smokeless tobacco: Never Used   • Tobacco comment: 2 years ago   Substance and Sexual Activity   • Alcohol use: No   • Drug use: No   • Sexual activity: Not noted       SURGICAL HISTORY   has a past surgical history that includes other; gyn surgery; and tubal ligation.    CURRENT MEDICATIONS  Home Medications     Reviewed by Angie Bryant R.N. (Registered Nurse) on 06/19/21 at 1506  Med List Status: Partial   Medication Last Dose Status   Buprenorphine HCl-Naloxone HCl (SUBOXONE SL)  Active   Dapagliflozin Propanediol (FARXIGA) 5 MG Tab  Active "   diazepam (VALIUM) 5 MG Tab  Active   Dulaglutide (TRULICITY SC)  Active   DULoxetine (CYMBALTA) 60 MG Cap DR Particles delayed-release capsule  Active   ibuprofen (MOTRIN) 600 MG TABS  Active   methocarbamol (ROBAXIN) 500 MG TABS  Active   ondansetron (ZOFRAN ODT) 4 MG TABLET DISPERSIBLE  Active                ALLERGIES  No Known Allergies    PHYSICAL EXAM  VITAL SIGNS: /88   Pulse 77   Temp 36.3 °C (97.3 °F) (Temporal)   Resp 16   Ht 1.524 m (5')   Wt 113 kg (250 lb)   SpO2 99%   BMI 48.82 kg/m²     Pulse ox interpretation: I interpret this pulse ox as normal.  Constitutional: Alert in no apparent distress.   HENT: No signs of trauma, Bilateral external ears normal, Nose normal.   Eyes: Pupils are equal, Conjunctiva normal, Non-icteric.   Neck: Normal range of motion, No tenderness, Supple, No stridor.   Cardiovascular: Regular rate and rhythm, no murmurs. Symmetric distal pulses. No cyanosis of extremities. No peripheral edema of extremities.  Thorax & Lungs: Normal breath sounds, No respiratory distress, No wheezing, No chest tenderness.   Abdomen: Soft, Non-tender.   Skin: Warm, Dry, No erythema, No rash.   Back: No midline bony tenderness, No CVA tenderness.   Musculoskeletal: Good range of motion in all major joints. No tenderness to palpation or major deformities noted.   Neurologic: Alert , Normal motor function, Normal sensory function, No focal deficits noted.   Psychiatric: Affect normal, Judgment normal, Mood normal.     DIAGNOSTIC STUDIES / PROCEDURES    LABS  Pertinent Labs & Imaging studies reviewed. (See chart for details)    COURSE & MEDICAL DECISION MAKING  Pertinent Labs & Imaging studies reviewed. (See chart for details)    5:33 PM This is a 40 y.o. female who presents with nausea and vomiting over the past 24 hours and the differential diagnosis includes but is not limited to Inflammatory reaction from vaccine, Dehydration, Electrolyte abnormalities, Gastroenteritis, Urinary tract  infection, pregnancy. Ordered for Beta-HCG Qualitative Serum, CBC w/ Differential, CMP, Lipase, and UA to evaluate. Patient will be treated with Zofran 4 mg and dextrose 5 % infusion for the nausea.  Point of care glucose 80.    6:12 pm - She was reevaluated at bedside. Discussed lab results with her and informed her that she likely had a viral illness but I have no way of definitely saying this is not from vaccine.  Her mother has similar symptoms.  Lab work is reassuring and within acceptable limits.. She reports feeling improved.     7:50 PM - She was reevaluated at bedside and continues to feel improved. She is stable for discharge. Recommended she follow up with her PCP.  She left before her urinalysis results.  I did tell her I would follow-up on and if abnormal I would call her.    Urinalysis later returned with slightly increased glucose.  Otherwise normal.     The patient will return for worsening symptoms and is stable at the time of discharge. The patient verbalizes understanding and will comply. Guidance provided on appropriate use of medications.The patient will not drink alcohol nor drive with prescribed medications.     The patient is referred to a primary physician for blood pressure management, diabetic screening, and for all other preventative health concerns.      DISPOSITION:  Patient will be discharged home in stable condition.    FOLLOW UP:  Jadiel Ignacio M.D.  [address] [facility] [address] [phone]    Call   to make follow up appointment    [facility], Emergency Dept  [address] [facility] [address] [phone]    if high fever, trouble breatihng, unable to stop vomiting or other serious concerns      OUTPATIENT MEDICATIONS:  Discharge Medication List as of 6/19/2021  8:03 PM            FINAL IMPRESSION  1. Nausea and vomiting, intractability of vomiting not specified, unspecified vomiting type    2. Dehydration          IJoy  (Scribe), am scribing for, and in the presence of, MAAME Thornton II.    Electronically signed by: Joy Douglas (Scribe), 6/19/2021    ISae II, M* personally performed the services described in this documentation, as scribed by Joy Douglas in my presence, and it is both accurate and complete. C    The note accurately reflects work and decisions made by me.  Sae Cherry II, M.D.  6/19/2021  9:56 PM

## 2021-06-20 NOTE — ED NOTES
"This RN in to start IV and draw blood,, pt questioning \" why are we doing blood work an IV, she is terrified of needles\". Explained to pt that if she was in the ER and feelin poorly the doctor wanted some testing done. Pt refusing IV's in lower forearm or hand, this RN attempted IV in RFA and pt stated it hurt and didn't \" want me digging around\".  Kathrin S RN to bedside to start pt's IV   "
PIV removed and bandaged.  Luisa Jeffers discharged via ambulation with family.  Discharge instructions given and reviewed, patient educated to follow up with PCP, verbalized understanding.  Prescriptions given x 1 called in electronically for pickup.  All personal belongings in possession.  No questions at this time.          
Patient ambulatory to BR, steady gait, clean catch urine sent to lab.  PO challenge - water and crackers, patient tolerating well.    
Pt brought to room via w/c.  
Pt reported off to Delia CALIX   
back pain

## 2021-06-22 ENCOUNTER — APPOINTMENT (OUTPATIENT)
Dept: RADIOLOGY | Facility: MEDICAL CENTER | Age: 41
End: 2021-06-22
Attending: EMERGENCY MEDICINE
Payer: MEDICAID

## 2021-06-22 ENCOUNTER — HOSPITAL ENCOUNTER (EMERGENCY)
Facility: MEDICAL CENTER | Age: 41
End: 2021-06-22
Attending: EMERGENCY MEDICINE
Payer: MEDICAID

## 2021-06-22 VITALS
SYSTOLIC BLOOD PRESSURE: 121 MMHG | BODY MASS INDEX: 50.03 KG/M2 | RESPIRATION RATE: 18 BRPM | DIASTOLIC BLOOD PRESSURE: 76 MMHG | HEART RATE: 85 BPM | TEMPERATURE: 97.5 F | HEIGHT: 60 IN | WEIGHT: 254.85 LBS | OXYGEN SATURATION: 95 %

## 2021-06-22 DIAGNOSIS — U07.1 COVID-19: ICD-10-CM

## 2021-06-22 LAB
ALBUMIN SERPL BCP-MCNC: 3.8 G/DL (ref 3.2–4.9)
ALBUMIN/GLOB SERPL: 1.2 G/DL
ALP SERPL-CCNC: 103 U/L (ref 30–99)
ALT SERPL-CCNC: 16 U/L (ref 2–50)
ANION GAP SERPL CALC-SCNC: 10 MMOL/L (ref 7–16)
APPEARANCE UR: CLEAR
AST SERPL-CCNC: 16 U/L (ref 12–45)
BACTERIA #/AREA URNS HPF: NEGATIVE /HPF
BASOPHILS # BLD AUTO: 0.4 % (ref 0–1.8)
BASOPHILS # BLD: 0.02 K/UL (ref 0–0.12)
BILIRUB SERPL-MCNC: 0.2 MG/DL (ref 0.1–1.5)
BILIRUB UR QL STRIP.AUTO: NEGATIVE
BUN SERPL-MCNC: 9 MG/DL (ref 8–22)
CALCIUM SERPL-MCNC: 9.1 MG/DL (ref 8.4–10.2)
CHLORIDE SERPL-SCNC: 101 MMOL/L (ref 96–112)
CO2 SERPL-SCNC: 27 MMOL/L (ref 20–33)
COLOR UR: YELLOW
CREAT SERPL-MCNC: 0.66 MG/DL (ref 0.5–1.4)
EOSINOPHIL # BLD AUTO: 0.02 K/UL (ref 0–0.51)
EOSINOPHIL NFR BLD: 0.4 % (ref 0–6.9)
EPI CELLS #/AREA URNS HPF: NEGATIVE /HPF
ERYTHROCYTE [DISTWIDTH] IN BLOOD BY AUTOMATED COUNT: 42.5 FL (ref 35.9–50)
FLUAV AG SPEC QL IA: NEGATIVE
FLUBV AG SPEC QL IA: NEGATIVE
GLOBULIN SER CALC-MCNC: 3.3 G/DL (ref 1.9–3.5)
GLUCOSE SERPL-MCNC: 82 MG/DL (ref 65–99)
GLUCOSE UR STRIP.AUTO-MCNC: 500 MG/DL
HCT VFR BLD AUTO: 40.7 % (ref 37–47)
HGB BLD-MCNC: 12.7 G/DL (ref 12–16)
HYALINE CASTS #/AREA URNS LPF: ABNORMAL /LPF
IMM GRANULOCYTES # BLD AUTO: 0.03 K/UL (ref 0–0.11)
IMM GRANULOCYTES NFR BLD AUTO: 0.7 % (ref 0–0.9)
KETONES UR STRIP.AUTO-MCNC: NEGATIVE MG/DL
LACTATE BLD-SCNC: 1.3 MMOL/L (ref 0.5–2)
LACTATE BLD-SCNC: 2.1 MMOL/L (ref 0.5–2)
LEUKOCYTE ESTERASE UR QL STRIP.AUTO: NEGATIVE
LYMPHOCYTES # BLD AUTO: 0.96 K/UL (ref 1–4.8)
LYMPHOCYTES NFR BLD: 21 % (ref 22–41)
MCH RBC QN AUTO: 26.1 PG (ref 27–33)
MCHC RBC AUTO-ENTMCNC: 31.2 G/DL (ref 33.6–35)
MCV RBC AUTO: 83.6 FL (ref 81.4–97.8)
MICRO URNS: ABNORMAL
MONOCYTES # BLD AUTO: 0.48 K/UL (ref 0–0.85)
MONOCYTES NFR BLD AUTO: 10.5 % (ref 0–13.4)
NEUTROPHILS # BLD AUTO: 3.07 K/UL (ref 2–7.15)
NEUTROPHILS NFR BLD: 67 % (ref 44–72)
NITRITE UR QL STRIP.AUTO: NEGATIVE
NRBC # BLD AUTO: 0 K/UL
NRBC BLD-RTO: 0 /100 WBC
PH UR STRIP.AUTO: 6 [PH] (ref 5–8)
PLATELET # BLD AUTO: 181 K/UL (ref 164–446)
PMV BLD AUTO: 10.2 FL (ref 9–12.9)
POTASSIUM SERPL-SCNC: 3.9 MMOL/L (ref 3.6–5.5)
PROT SERPL-MCNC: 7.1 G/DL (ref 6–8.2)
PROT UR QL STRIP: NEGATIVE MG/DL
RBC # BLD AUTO: 4.87 M/UL (ref 4.2–5.4)
RBC # URNS HPF: ABNORMAL /HPF
RBC UR QL AUTO: ABNORMAL
S PYO DNA SPEC NAA+PROBE: NOT DETECTED
SARS-COV+SARS-COV-2 AG RESP QL IA.RAPID: DETECTED
SARS-COV-2 RNA RESP QL NAA+PROBE: DETECTED
SODIUM SERPL-SCNC: 138 MMOL/L (ref 135–145)
SP GR UR STRIP.AUTO: 1.01
SPECIMEN SOURCE: ABNORMAL
SPECIMEN SOURCE: ABNORMAL
WBC # BLD AUTO: 4.6 K/UL (ref 4.8–10.8)
WBC #/AREA URNS HPF: ABNORMAL /HPF

## 2021-06-22 PROCEDURE — 80053 COMPREHEN METABOLIC PANEL: CPT

## 2021-06-22 PROCEDURE — U0003 INFECTIOUS AGENT DETECTION BY NUCLEIC ACID (DNA OR RNA); SEVERE ACUTE RESPIRATORY SYNDROME CORONAVIRUS 2 (SARS-COV-2) (CORONAVIRUS DISEASE [COVID-19]), AMPLIFIED PROBE TECHNIQUE, MAKING USE OF HIGH THROUGHPUT TECHNOLOGIES AS DESCRIBED BY CMS-2020-01-R: HCPCS

## 2021-06-22 PROCEDURE — 700111 HCHG RX REV CODE 636 W/ 250 OVERRIDE (IP): Performed by: EMERGENCY MEDICINE

## 2021-06-22 PROCEDURE — 87651 STREP A DNA AMP PROBE: CPT

## 2021-06-22 PROCEDURE — A9270 NON-COVERED ITEM OR SERVICE: HCPCS | Performed by: EMERGENCY MEDICINE

## 2021-06-22 PROCEDURE — 83605 ASSAY OF LACTIC ACID: CPT | Mod: 91

## 2021-06-22 PROCEDURE — 87086 URINE CULTURE/COLONY COUNT: CPT

## 2021-06-22 PROCEDURE — 87400 INFLUENZA A/B EACH AG IA: CPT

## 2021-06-22 PROCEDURE — 96374 THER/PROPH/DIAG INJ IV PUSH: CPT

## 2021-06-22 PROCEDURE — 700102 HCHG RX REV CODE 250 W/ 637 OVERRIDE(OP): Performed by: EMERGENCY MEDICINE

## 2021-06-22 PROCEDURE — 81001 URINALYSIS AUTO W/SCOPE: CPT

## 2021-06-22 PROCEDURE — 700105 HCHG RX REV CODE 258: Performed by: EMERGENCY MEDICINE

## 2021-06-22 PROCEDURE — 87040 BLOOD CULTURE FOR BACTERIA: CPT | Mod: 91

## 2021-06-22 PROCEDURE — 87426 SARSCOV CORONAVIRUS AG IA: CPT

## 2021-06-22 PROCEDURE — 99285 EMERGENCY DEPT VISIT HI MDM: CPT

## 2021-06-22 PROCEDURE — 71045 X-RAY EXAM CHEST 1 VIEW: CPT

## 2021-06-22 PROCEDURE — 36415 COLL VENOUS BLD VENIPUNCTURE: CPT

## 2021-06-22 PROCEDURE — U0005 INFEC AGEN DETEC AMPLI PROBE: HCPCS

## 2021-06-22 PROCEDURE — 85025 COMPLETE CBC W/AUTO DIFF WBC: CPT

## 2021-06-22 PROCEDURE — C9803 HOPD COVID-19 SPEC COLLECT: HCPCS | Performed by: EMERGENCY MEDICINE

## 2021-06-22 RX ORDER — ACETAMINOPHEN 325 MG/1
650 TABLET ORAL ONCE
Status: COMPLETED | OUTPATIENT
Start: 2021-06-22 | End: 2021-06-22

## 2021-06-22 RX ORDER — BENZONATATE 100 MG/1
100 CAPSULE ORAL 3 TIMES DAILY PRN
Status: DISCONTINUED | OUTPATIENT
Start: 2021-06-22 | End: 2021-06-22 | Stop reason: HOSPADM

## 2021-06-22 RX ORDER — SODIUM CHLORIDE 9 MG/ML
1000 INJECTION, SOLUTION INTRAVENOUS ONCE
Status: COMPLETED | OUTPATIENT
Start: 2021-06-22 | End: 2021-06-22

## 2021-06-22 RX ORDER — KETOROLAC TROMETHAMINE 30 MG/ML
30 INJECTION, SOLUTION INTRAMUSCULAR; INTRAVENOUS ONCE
Status: COMPLETED | OUTPATIENT
Start: 2021-06-22 | End: 2021-06-22

## 2021-06-22 RX ORDER — ONDANSETRON 4 MG/1
4 TABLET, ORALLY DISINTEGRATING ORAL ONCE
Status: COMPLETED | OUTPATIENT
Start: 2021-06-22 | End: 2021-06-22

## 2021-06-22 RX ORDER — HYDROCODONE BITARTRATE AND ACETAMINOPHEN 5; 325 MG/1; MG/1
1 TABLET ORAL ONCE
Status: DISCONTINUED | OUTPATIENT
Start: 2021-06-22 | End: 2021-06-22

## 2021-06-22 RX ADMIN — ONDANSETRON 4 MG: 4 TABLET, ORALLY DISINTEGRATING ORAL at 13:46

## 2021-06-22 RX ADMIN — SODIUM CHLORIDE 1000 ML: 9 INJECTION, SOLUTION INTRAVENOUS at 13:23

## 2021-06-22 RX ADMIN — SODIUM CHLORIDE 1000 ML: 9 INJECTION, SOLUTION INTRAVENOUS at 14:14

## 2021-06-22 RX ADMIN — ACETAMINOPHEN 650 MG: 325 TABLET ORAL at 15:31

## 2021-06-22 RX ADMIN — KETOROLAC TROMETHAMINE 30 MG: 30 INJECTION, SOLUTION INTRAMUSCULAR at 13:25

## 2021-06-22 RX ADMIN — BENZONATATE 100 MG: 100 CAPSULE ORAL at 13:08

## 2021-06-22 ASSESSMENT — FIBROSIS 4 INDEX: FIB4 SCORE: 0.72

## 2021-06-22 NOTE — ED TRIAGE NOTES
Pt ambulates to triage  Chief Complaint   Patient presents with   • Body Aches   • Headache   • Fatigue   • Letter for School/Work     Pt reports she received the COVID vaccine and has been having symptoms since seen 6/19 in ER for same symptoms  Requests work note    Pt A & 0 x 4, speech clear, ambulates well    Pt updated on triage process and asked to inform RN of any changes while waiting in lobby.

## 2021-06-22 NOTE — DISCHARGE INSTRUCTIONS
Return to the ER for shortness of breath, oxygen level below 90%, or other concerns    Drink plenty of fluids, take Tylenol 650 mg every 4 hours and ibuprofen 600 mg every 6 hours with food as needed for fever, body aches, and headache.    Follow-up with your primary care doctor as needed.

## 2021-06-22 NOTE — ED PROVIDER NOTES
ED Provider Note    CHIEF COMPLAINT  Chief Complaint   Patient presents with   • Body Aches   • Headache   • Fatigue   • Letter for School/Work       HPI  Luisa Jeffers is a 40 y.o. diabetic female who presents complaining of headache, myalgias, subjective fever with sweats since 6/15 or 16.  Patient received a Covid vaccine on 6/13.  Several days later she developed a headache, fever, nausea, vomiting, dizziness.  Yesterday she developed a cough that is mainly dry but somewhat productive of clear sputum and a sore throat.    Patient took ibuprofen at 10 AM today but her headache is already coming back.  She reports taking Tylenol yesterday with similar results.    Patient denies sick contacts, recent travel, rash, neck stiffness, diarrhea.      ALLERGIES  No Known Allergies    CURRENT MEDICATIONS  Home Medications     Reviewed by Mishel Kaplan R.N. (Registered Nurse) on 06/22/21 at 1212  Med List Status: Partial   Medication Last Dose Status   Buprenorphine HCl-Naloxone HCl (SUBOXONE SL)  Active   Dapagliflozin Propanediol (FARXIGA) 5 MG Tab  Active   diazepam (VALIUM) 5 MG Tab  Active   Dulaglutide (TRULICITY SC)  Active   DULoxetine (CYMBALTA) 60 MG Cap DR Particles delayed-release capsule  Active   ibuprofen (MOTRIN) 600 MG TABS  Active   methocarbamol (ROBAXIN) 500 MG TABS  Active   ondansetron (ZOFRAN ODT) 4 MG TABLET DISPERSIBLE  Active                PAST MEDICAL HISTORY   has a past medical history of Bilateral ovarian cysts, Depression, Diabetes (HCC), Scoliosis, and Spinal stenosis.    SURGICAL HISTORY   has a past surgical history that includes other; gyn surgery; and tubal ligation.    SOCIAL HISTORY  Social History     Tobacco Use   • Smoking status: Former Smoker   • Smokeless tobacco: Never Used   • Tobacco comment: 2 years ago   Substance and Sexual Activity   • Alcohol use: No   • Drug use: No   • Sexual activity: Not on file       Family Hx:  Denies    REVIEW OF SYSTEMS  See HPI for further  details.  All other systems are negative except as above in HPI.    PHYSICAL EXAM  VITAL SIGNS: /76   Pulse 85   Temp 36.4 °C (97.5 °F) (Temporal)   Resp 18   Ht 1.524 m (5')   Wt 116 kg (254 lb 13.6 oz)   LMP 06/11/2021   SpO2 95%   BMI 49.77 kg/m²    General:  WD  female with elevated BMI, nontoxic appearing in NAD; A+Ox3; V/S as above, afebrile  Skin: warm and dry; good color; no rash  HEENT: NCAT; EOMs intact; PERRL; no scleral icterus; oropharynx clear  Neck: FROM; no meningismus, no LAD  Cardiovascular: Regular heart rate and rhythm.  No murmurs, rubs, or gallops; pulses 2+ bilaterally radially  Lungs: Clear to auscultation with good air movement bilaterally.  No wheezes, rhonchi, or rales.   Extremities: REYNOLDS x 4; no e/o trauma; no pedal edema  Neurologic: CNs III-XII grossly intact; speech clear; distal sensation intact; strength 5/5 UE/LEs  Psychiatric: Appropriate affect, normal mood    LABS  Results for orders placed or performed during the hospital encounter of 06/22/21   SARS-CoV-2 PCR (24 hour In-House): Collect NP swab in VTM    Specimen: Respirate   Result Value Ref Range    SARS-CoV-2 Source NP Swab    CBC WITH DIFFERENTIAL   Result Value Ref Range    WBC 4.6 (L) 4.8 - 10.8 K/uL    RBC 4.87 4.20 - 5.40 M/uL    Hemoglobin 12.7 12.0 - 16.0 g/dL    Hematocrit 40.7 37.0 - 47.0 %    MCV 83.6 81.4 - 97.8 fL    MCH 26.1 (L) 27.0 - 33.0 pg    MCHC 31.2 (L) 33.6 - 35.0 g/dL    RDW 42.5 35.9 - 50.0 fL    Platelet Count 181 164 - 446 K/uL    MPV 10.2 9.0 - 12.9 fL    Neutrophils-Polys 67.00 44.00 - 72.00 %    Lymphocytes 21.00 (L) 22.00 - 41.00 %    Monocytes 10.50 0.00 - 13.40 %    Eosinophils 0.40 0.00 - 6.90 %    Basophils 0.40 0.00 - 1.80 %    Immature Granulocytes 0.70 0.00 - 0.90 %    Nucleated RBC 0.00 /100 WBC    Neutrophils (Absolute) 3.07 2.00 - 7.15 K/uL    Lymphs (Absolute) 0.96 (L) 1.00 - 4.80 K/uL    Monos (Absolute) 0.48 0.00 - 0.85 K/uL    Eos (Absolute) 0.02 0.00 - 0.51  K/uL    Baso (Absolute) 0.02 0.00 - 0.12 K/uL    Immature Granulocytes (abs) 0.03 0.00 - 0.11 K/uL    NRBC (Absolute) 0.00 K/uL   CMP   Result Value Ref Range    Sodium 138 135 - 145 mmol/L    Potassium 3.9 3.6 - 5.5 mmol/L    Chloride 101 96 - 112 mmol/L    Co2 27 20 - 33 mmol/L    Anion Gap 10.0 7.0 - 16.0    Glucose 82 65 - 99 mg/dL    Bun 9 8 - 22 mg/dL    Creatinine 0.66 0.50 - 1.40 mg/dL    Calcium 9.1 8.4 - 10.2 mg/dL    AST(SGOT) 16 12 - 45 U/L    ALT(SGPT) 16 2 - 50 U/L    Alkaline Phosphatase 103 (H) 30 - 99 U/L    Total Bilirubin 0.2 0.1 - 1.5 mg/dL    Albumin 3.8 3.2 - 4.9 g/dL    Total Protein 7.1 6.0 - 8.2 g/dL    Globulin 3.3 1.9 - 3.5 g/dL    A-G Ratio 1.2 g/dL   URINALYSIS    Specimen: Urine   Result Value Ref Range    Color Yellow     Character Clear     Specific Gravity 1.015 <1.035    Ph 6.0 5.0 - 8.0    Glucose 500 (A) Negative mg/dL    Ketones Negative Negative mg/dL    Protein Negative Negative mg/dL    Bilirubin Negative Negative    Nitrite Negative Negative    Leukocyte Esterase Negative Negative    Occult Blood Small (A) Negative    Micro Urine Req Microscopic    LACTIC ACID   Result Value Ref Range    Lactic Acid 2.1 (H) 0.5 - 2.0 mmol/L   CoV, Flu A/B, Antigens, JAVI   Result Value Ref Range    Influenza A AG by JAVI Negative Negative    Influenza B AG by JAVI Negative Negative    SARS-COV ANTIGEN JAVI DETECTED (AA) Not-Detected    SARS-CoV-2 Source Nasal Swab    Group A Strep by PCR   Result Value Ref Range    Group A Strep by PCR Not Detected Not Detected   URINE MICROSCOPIC (W/UA)   Result Value Ref Range    WBC 0-2 /hpf    RBC 2-5 (A) /hpf    Bacteria Negative None /hpf    Epithelial Cells Negative Few /hpf    Hyaline Cast 0-2 /lpf   LACTIC ACID   Result Value Ref Range    Lactic Acid 1.3 0.5 - 2.0 mmol/L   ESTIMATED GFR   Result Value Ref Range    GFR If African American >60 >60 mL/min/1.73 m 2    GFR If Non African American >60 >60 mL/min/1.73 m 2           IMAGING  DX-CHEST-PORTABLE (1  VIEW)   Final Result      No acute cardiopulmonary abnormality identified.        MEDICAL RECORD  I have reviewed patient's medical record and pertinent results are listed below.      COURSE & MEDICAL DECISION MAKING  I have reviewed any medical record information, laboratory studies and radiographic results as noted.    Luisa Jeffers is a 40 y.o. diabetic female who presents complaining of malaise, myalgias, headache, dry cough, nausea and vomiting.  Patient is nontoxic appearing but understandably does not feel well.  I suspect a viral etiology.  Patient reports Covid vaccine administration several days prior to the onset of symptoms.  This seems delayed to be able to attribute her symptoms to a vaccine response.  I considered meningitis but patient has no meningismus and no michael photophobia.  If anything, it would be a viral process/meningitis rather than bacterial.  I also considered influenza, other viral illness, strep, sepsis, pneumonia.  No exposures to suggest Lyme or other tickborne illnesses.  No rashes reported.    Appropriate PPE was worn at all times while interacting with the patient, including goggles, N95 mask, and surgical mask.    NS bolus was ordered for possible dehydration related to patient's sxs of nausea, vomiting, and headache.    Toradol ordered.  Patient felt improved after the administration of this.    Pt's HR normalized after fluid bolus  Chest x-ray demonstrates no pneumonia.    Cultures pending.    Lactic acid returned at 2.1.  We will repeat this after fluid bolus.    Repeat lactic acid normal.    Labs demonstrate leukopenia of 4.6 without bandemia or elevation of immature granulocytes.  Lymphocytes were low.  This is consistent with a viral process.  Covid then returned positive.    Urinalysis negative.  Influenza negative.  Strep negative.    Patient was advised of her Covid positive status.  Her children were asked to leave the room and isolate/quarantine.  The patient had  received her Covid vaccine 2 days prior to developing the symptoms.  Her children are not vaccinated.    Patient was given return precautions and advised to treat her symptoms at home and isolate.  She has a pulse ox she can obtain from her mom.  At this time she is not meeting admission criteria.    FINAL IMPRESSION  1. COVID-19              Electronically signed by: Sis Mcleod M.D., 6/22/2021 12:55 PM

## 2021-06-23 ENCOUNTER — HOSPITAL ENCOUNTER (EMERGENCY)
Facility: MEDICAL CENTER | Age: 41
End: 2021-06-23
Attending: EMERGENCY MEDICINE
Payer: MEDICAID

## 2021-06-23 VITALS
DIASTOLIC BLOOD PRESSURE: 79 MMHG | TEMPERATURE: 97.8 F | HEIGHT: 60 IN | BODY MASS INDEX: 50.06 KG/M2 | WEIGHT: 255 LBS | HEART RATE: 74 BPM | SYSTOLIC BLOOD PRESSURE: 160 MMHG | OXYGEN SATURATION: 93 % | RESPIRATION RATE: 18 BRPM

## 2021-06-23 DIAGNOSIS — U07.1 COVID-19: ICD-10-CM

## 2021-06-23 PROCEDURE — 700105 HCHG RX REV CODE 258: Performed by: EMERGENCY MEDICINE

## 2021-06-23 PROCEDURE — 96375 TX/PRO/DX INJ NEW DRUG ADDON: CPT

## 2021-06-23 PROCEDURE — 99284 EMERGENCY DEPT VISIT MOD MDM: CPT

## 2021-06-23 PROCEDURE — A9270 NON-COVERED ITEM OR SERVICE: HCPCS | Performed by: EMERGENCY MEDICINE

## 2021-06-23 PROCEDURE — 700102 HCHG RX REV CODE 250 W/ 637 OVERRIDE(OP): Performed by: EMERGENCY MEDICINE

## 2021-06-23 PROCEDURE — 700111 HCHG RX REV CODE 636 W/ 250 OVERRIDE (IP): Performed by: EMERGENCY MEDICINE

## 2021-06-23 PROCEDURE — 96374 THER/PROPH/DIAG INJ IV PUSH: CPT

## 2021-06-23 RX ORDER — ACETAMINOPHEN 325 MG/1
650 TABLET ORAL ONCE
Status: COMPLETED | OUTPATIENT
Start: 2021-06-23 | End: 2021-06-23

## 2021-06-23 RX ORDER — ONDANSETRON 2 MG/ML
4 INJECTION INTRAMUSCULAR; INTRAVENOUS ONCE
Status: COMPLETED | OUTPATIENT
Start: 2021-06-23 | End: 2021-06-23

## 2021-06-23 RX ORDER — KETOROLAC TROMETHAMINE 30 MG/ML
15 INJECTION, SOLUTION INTRAMUSCULAR; INTRAVENOUS ONCE
Status: COMPLETED | OUTPATIENT
Start: 2021-06-23 | End: 2021-06-23

## 2021-06-23 RX ORDER — BUPRENORPHINE HYDROCHLORIDE AND NALOXONE HYDROCHLORIDE DIHYDRATE 8; 2 MG/1; MG/1
1 TABLET SUBLINGUAL ONCE
Status: DISCONTINUED | OUTPATIENT
Start: 2021-06-23 | End: 2021-06-23 | Stop reason: HOSPADM

## 2021-06-23 RX ORDER — SODIUM CHLORIDE, SODIUM LACTATE, POTASSIUM CHLORIDE, CALCIUM CHLORIDE 600; 310; 30; 20 MG/100ML; MG/100ML; MG/100ML; MG/100ML
1000 INJECTION, SOLUTION INTRAVENOUS ONCE
Status: COMPLETED | OUTPATIENT
Start: 2021-06-23 | End: 2021-06-23

## 2021-06-23 RX ADMIN — SODIUM CHLORIDE: 9 INJECTION, SOLUTION INTRAVENOUS at 06:29

## 2021-06-23 RX ADMIN — ONDANSETRON 4 MG: 2 INJECTION INTRAMUSCULAR; INTRAVENOUS at 08:00

## 2021-06-23 RX ADMIN — ACETAMINOPHEN 650 MG: 325 TABLET, FILM COATED ORAL at 07:36

## 2021-06-23 RX ADMIN — SODIUM CHLORIDE, POTASSIUM CHLORIDE, SODIUM LACTATE AND CALCIUM CHLORIDE 1000 ML: 600; 310; 30; 20 INJECTION, SOLUTION INTRAVENOUS at 04:51

## 2021-06-23 RX ADMIN — KETOROLAC TROMETHAMINE 15 MG: 60 INJECTION, SOLUTION INTRAMUSCULAR at 04:51

## 2021-06-23 ASSESSMENT — FIBROSIS 4 INDEX: FIB4 SCORE: 0.84

## 2021-06-23 NOTE — ED NOTES
Pt lying in bed, appears to be sleeping with regular respirations and without signs of distress. Awakens easily with verbal stimuli.

## 2021-06-23 NOTE — ED NOTES
bamlanivimab infusion ended. Patient denies SOB, rash, itchiness, or hives. Patient educated on need for post infusion monitoring prior to discharge, patient verbalized understanding

## 2021-06-23 NOTE — ED NOTES
Pt semi reclined in bed, awake, speaking without signs of distress. States understanding of discharge instructions.

## 2021-06-23 NOTE — ED PROVIDER NOTES
ED Provider Note    CHIEF COMPLAINT  Chief Complaint   Patient presents with   • Headache     Patient tested positive for Covid yesterday. Patient reports she was discharged and told to take Tylenol and Ibuprofen at home. Patient reports no relief from Ibupfrofen and Tylenol.    • Sore Throat       HPI  Luisa Jeffers is a 40 y.o. female who presents to the emergency department feeling unwell.  Patient started getting sick on 6/16 3 days after receiving Covid vaccine.  She first had nausea and vomiting diarrhea.  This progressed to headache, fever and congestion.  Mild moist cough.  No shortness of breath.  She was seen in the emergency department.  She was diagnosed with COVID-19.  Advised symptomatic management.  She reports her headache is not any better since yesterday.  Throbbing generalized and severe.  She has a history of opiate addiction.  She is on Suboxone at home but has not taken this for 2 days because she thought it might make things worse.  She has a history of diabetes.    REVIEW OF SYSTEMS  As per HPI, otherwise a 10 point review of systems is negative    PAST MEDICAL HISTORY  Past Medical History:   Diagnosis Date   • Bilateral ovarian cysts    • Depression    • Diabetes (HCC)    • Scoliosis    • Spinal stenosis        SOCIAL HISTORY  Social History     Tobacco Use   • Smoking status: Former Smoker   • Smokeless tobacco: Never Used   • Tobacco comment: 2 years ago   Vaping Use   • Vaping Use: Never used   Substance Use Topics   • Alcohol use: No   • Drug use: No       SURGICAL HISTORY  Past Surgical History:   Procedure Laterality Date   • GYN SURGERY     • OTHER     • TUBAL LIGATION         CURRENT MEDICATIONS  Home Medications     Reviewed by Yaneli Reyes R.N. (Registered Nurse) on 06/23/21 at 0303  Med List Status: Not Addressed   Medication Last Dose Status   Buprenorphine HCl-Naloxone HCl (SUBOXONE SL)  Active   Dapagliflozin Propanediol (FARXIGA) 5 MG Tab  Active   diazepam (VALIUM) 5  MG Tab  Active   Dulaglutide (TRULICITY SC)  Active   DULoxetine (CYMBALTA) 60 MG Cap DR Particles delayed-release capsule  Active   ibuprofen (MOTRIN) 600 MG TABS  Active   methocarbamol (ROBAXIN) 500 MG TABS  Active   ondansetron (ZOFRAN ODT) 4 MG TABLET DISPERSIBLE  Active                ALLERGIES  No Known Allergies    PHYSICAL EXAM  VITAL SIGNS: /84   Pulse 90   Temp 36.6 °C (97.8 °F) (Temporal)   Resp 16   Ht 1.524 m (5')   Wt 116 kg (255 lb)   LMP 06/11/2021   SpO2 93%   BMI 49.80 kg/m²    Constitutional: Awake and alert.  Appears uncomfortable shielding her eyes from light  HENT: Dry mucous membranes  Eyes: Normal inspection, pupils equal round and reactive  Neck: Grossly normal range of motion.  No meningismus  Cardiovascular: Normal heart rate, Normal rhythm.  Symmetric peripheral pulses.   Thorax & Lungs: No respiratory distress, No wheezing, No rales, No rhonchi, No chest tenderness.   Abdomen: Bowel sounds normal, soft, non-distended, nontender, no mass  Skin: No obvious rash.  Back: No tenderness, No CVA tenderness.   Extremities: No clubbing, cyanosis, edema, no Homans or cords.  Neurologic: Grossly normal   Psychiatric: Normal for situation    RADIOLOGY/PROCEDURES  Chest x-ray negative yesterday    Chest  Labs:  Results for orders placed or performed during the hospital encounter of 06/22/21   SARS-CoV-2 PCR (24 hour In-House): Collect NP swab in VTM    Specimen: Respirate   Result Value Ref Range    SARS-CoV-2 Source NP Swab     SARS-CoV-2 by PCR DETECTED (AA)    CBC WITH DIFFERENTIAL   Result Value Ref Range    WBC 4.6 (L) 4.8 - 10.8 K/uL    RBC 4.87 4.20 - 5.40 M/uL    Hemoglobin 12.7 12.0 - 16.0 g/dL    Hematocrit 40.7 37.0 - 47.0 %    MCV 83.6 81.4 - 97.8 fL    MCH 26.1 (L) 27.0 - 33.0 pg    MCHC 31.2 (L) 33.6 - 35.0 g/dL    RDW 42.5 35.9 - 50.0 fL    Platelet Count 181 164 - 446 K/uL    MPV 10.2 9.0 - 12.9 fL    Neutrophils-Polys 67.00 44.00 - 72.00 %    Lymphocytes 21.00 (L)  22.00 - 41.00 %    Monocytes 10.50 0.00 - 13.40 %    Eosinophils 0.40 0.00 - 6.90 %    Basophils 0.40 0.00 - 1.80 %    Immature Granulocytes 0.70 0.00 - 0.90 %    Nucleated RBC 0.00 /100 WBC    Neutrophils (Absolute) 3.07 2.00 - 7.15 K/uL    Lymphs (Absolute) 0.96 (L) 1.00 - 4.80 K/uL    Monos (Absolute) 0.48 0.00 - 0.85 K/uL    Eos (Absolute) 0.02 0.00 - 0.51 K/uL    Baso (Absolute) 0.02 0.00 - 0.12 K/uL    Immature Granulocytes (abs) 0.03 0.00 - 0.11 K/uL    NRBC (Absolute) 0.00 K/uL   CMP   Result Value Ref Range    Sodium 138 135 - 145 mmol/L    Potassium 3.9 3.6 - 5.5 mmol/L    Chloride 101 96 - 112 mmol/L    Co2 27 20 - 33 mmol/L    Anion Gap 10.0 7.0 - 16.0    Glucose 82 65 - 99 mg/dL    Bun 9 8 - 22 mg/dL    Creatinine 0.66 0.50 - 1.40 mg/dL    Calcium 9.1 8.4 - 10.2 mg/dL    AST(SGOT) 16 12 - 45 U/L    ALT(SGPT) 16 2 - 50 U/L    Alkaline Phosphatase 103 (H) 30 - 99 U/L    Total Bilirubin 0.2 0.1 - 1.5 mg/dL    Albumin 3.8 3.2 - 4.9 g/dL    Total Protein 7.1 6.0 - 8.2 g/dL    Globulin 3.3 1.9 - 3.5 g/dL    A-G Ratio 1.2 g/dL   URINALYSIS    Specimen: Urine   Result Value Ref Range    Color Yellow     Character Clear     Specific Gravity 1.015 <1.035    Ph 6.0 5.0 - 8.0    Glucose 500 (A) Negative mg/dL    Ketones Negative Negative mg/dL    Protein Negative Negative mg/dL    Bilirubin Negative Negative    Nitrite Negative Negative    Leukocyte Esterase Negative Negative    Occult Blood Small (A) Negative    Micro Urine Req Microscopic    LACTIC ACID   Result Value Ref Range    Lactic Acid 2.1 (H) 0.5 - 2.0 mmol/L   CoV, Flu A/B, Antigens, JAVI   Result Value Ref Range    Influenza A AG by JAVI Negative Negative    Influenza B AG by JAVI Negative Negative    SARS-COV ANTIGEN JAVI DETECTED (AA) Not-Detected    SARS-CoV-2 Source Nasal Swab    Group A Strep by PCR   Result Value Ref Range    Group A Strep by PCR Not Detected Not Detected   URINE MICROSCOPIC (W/UA)   Result Value Ref Range    WBC 0-2 /hpf    RBC 2-5  (A) /hpf    Bacteria Negative None /hpf    Epithelial Cells Negative Few /hpf    Hyaline Cast 0-2 /lpf   LACTIC ACID   Result Value Ref Range    Lactic Acid 1.3 0.5 - 2.0 mmol/L   ESTIMATED GFR   Result Value Ref Range    GFR If African American >60 >60 mL/min/1.73 m 2    GFR If Non African American >60 >60 mL/min/1.73 m 2       Medications   lactated ringers infusion (BOLUS) (has no administration in time range)   ketorolac (TORADOL) injection 15 mg (has no administration in time range)   buprenorphine-naloxone (SUBOXONE) 8-2 mg SL 1 tablet (has no administration in time range)   bamlanivimab 700 mg, etesevimab 1,400 mg in  mL Infusion (has no administration in time range)       HYDRATION: Based on the patient's presentation of Dehydration the patient was given IV fluids. IV Hydration was used because oral hydration was not adequate alone. Upon recheck following hydration, the patient was Improved.    COURSE & MEDICAL DECISION MAKING  Patient presents with persistent symptoms related to COVID-19.  Her vital signs are stable.  No hypoxia.  She complains of headache.  Likely this is worsened because she has not taken her Suboxone.  I ordered IV fluids, Toradol and a tablet of Suboxone.  Patient does have risk factors for severe disease and meets criteria for monoclonal antibody infusion.    Monoclonal antibody infusion EUA progress note    This patient is considered a candidate for monoclonal antibody infusion to treat high risk SARS-CoV-2 virus infection.      Criteria for use:  -Mild to moderate illness for less than 10 days and is high risk for progressing to severe COVID-19 and/or hospitalization.    -Not hospitalized.   -Does not require oxygen therapy due to COVID-19.     ->40 kg and 12 years old or greater    Date of positive: 6/22/21    Symptoms of COVID-19: 6/16    High risk criteria:   -Body mass index of 35 or greater  -Chronic kidney disease  -Diabetes  -Immunosuppressive disease  -Receiving  immunosuppressive treatment  -65 years of age or greater  -55 years of age or greater AND has  -Cardiovascular disease  -Hypertension  -Chronic obstructive pulmonary disease  -12-17 years of age and have  -BMI greater than or equal to 85% of their age and gender based CDC growth chart  -Sickle cell disease  -Congenital or acquired heart disease  -Neurodevelopmental disorder  -Asthma requiring daily medication for control    This patient has been given the EUA patient fact sheet and consents to receiving this medication.    The patient will receive 700 mg Bamlanivimab and 1400 mg Etesevimab IV infusion  in 50 mL NS over 21 minutes.  The patient will be monitored for 1 hour following infusion.    At this point the patient is stable for discharge and outpatient management.  I have given standard instructions for COVID-19 and advised for isolation.  She will return to the ER for any difficulty breathing, uncontrolled symptoms or concern.      FINAL IMPRESSION  1.  COVID-19 virus infection      This dictation was created using voice recognition software. The accuracy of the dictation is limited to the abilities of the software.  The nursing notes were reviewed and certain aspects of this information were incorporated into this note.      Electronically signed by: Rush Means M.D., 6/23/2021 4:41 AM

## 2021-06-23 NOTE — ED TRIAGE NOTES
Luisa Jeffers  40 y.o. F  Chief Complaint   Patient presents with   • Headache     Patient tested positive for Covid yesterday. Patient reports she was discharged and told to take Tylenol and Ibuprofen at home. Patient reports no relief from Ibupfrofen and Tylenol.    • Sore Throat     Vitals:    06/23/21 0242   BP: 150/84   Pulse: 90   Resp: 16   Temp: 36.6 °C (97.8 °F)   SpO2: 93%     Triage process explained to patient, apologized for wait time, and returned to senior lounge.  Pt informed to notify staff of any change in condition.

## 2021-06-23 NOTE — ED NOTES
LR infusion still running. Encouraged patient to keep arm straight to increase IV flow rate, verbalized understanding

## 2021-06-23 NOTE — ED NOTES
Patient ambulated back to room with a steady gait. Patient changed into gown and placed on monitor.  Updated patient on plan of care, verbalized understanding. Patient wearing mask on arrival, droplet precautions sign in place

## 2021-06-23 NOTE — ED NOTES
PT states headache has increased as well as some nausea. Physician notified. New orders received.

## 2021-06-24 LAB
BACTERIA UR CULT: NORMAL
SIGNIFICANT IND 70042: NORMAL
SITE SITE: NORMAL
SOURCE SOURCE: NORMAL

## 2021-06-27 LAB
BACTERIA BLD CULT: NORMAL
BACTERIA BLD CULT: NORMAL
SIGNIFICANT IND 70042: NORMAL
SIGNIFICANT IND 70042: NORMAL
SITE SITE: NORMAL
SITE SITE: NORMAL
SOURCE SOURCE: NORMAL
SOURCE SOURCE: NORMAL

## 2025-07-24 ENCOUNTER — APPOINTMENT (OUTPATIENT)
Dept: RADIOLOGY | Facility: MEDICAL CENTER | Age: 45
End: 2025-07-24
Attending: EMERGENCY MEDICINE
Payer: COMMERCIAL

## 2025-07-24 ENCOUNTER — HOSPITAL ENCOUNTER (EMERGENCY)
Facility: MEDICAL CENTER | Age: 45
End: 2025-07-24
Attending: EMERGENCY MEDICINE
Payer: COMMERCIAL

## 2025-07-24 VITALS
DIASTOLIC BLOOD PRESSURE: 47 MMHG | HEART RATE: 92 BPM | WEIGHT: 286.82 LBS | TEMPERATURE: 97.4 F | SYSTOLIC BLOOD PRESSURE: 137 MMHG | HEIGHT: 60 IN | RESPIRATION RATE: 18 BRPM | BODY MASS INDEX: 56.31 KG/M2 | OXYGEN SATURATION: 95 %

## 2025-07-24 DIAGNOSIS — K42.9 UMBILICAL HERNIA WITHOUT OBSTRUCTION AND WITHOUT GANGRENE: ICD-10-CM

## 2025-07-24 DIAGNOSIS — K76.0 HEPATIC STEATOSIS: ICD-10-CM
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
## 2025-07-24 DIAGNOSIS — M54.41 ACUTE RIGHT-SIDED LOW BACK PAIN WITH RIGHT-SIDED SCIATICA: ICD-10-CM

## 2025-07-24 LAB
ALBUMIN SERPL BCP-MCNC: 3.8 G/DL (ref 3.2–4.9)
ALBUMIN/GLOB SERPL: 1.2 G/DL
ALP SERPL-CCNC: 147 U/L (ref 30–99)
ALT SERPL-CCNC: 36 U/L (ref 2–50)
ANION GAP SERPL CALC-SCNC: 18 MMOL/L (ref 7–16)
ANISOCYTOSIS BLD QL SMEAR: ABNORMAL
AST SERPL-CCNC: 36 U/L (ref 12–45)
BASOPHILS # BLD AUTO: 0 % (ref 0–1.8)
BASOPHILS # BLD: 0 K/UL (ref 0–0.12)
BILIRUB SERPL-MCNC: <0.2 MG/DL (ref 0.1–1.5)
BUN SERPL-MCNC: 8 MG/DL (ref 8–22)
CALCIUM ALBUM COR SERPL-MCNC: 9.5 MG/DL (ref 8.5–10.5)
CALCIUM SERPL-MCNC: 9.3 MG/DL (ref 8.5–10.5)
CHLORIDE SERPL-SCNC: 99 MMOL/L (ref 96–112)
CO2 SERPL-SCNC: 22 MMOL/L (ref 20–33)
CREAT SERPL-MCNC: 0.61 MG/DL (ref 0.5–1.4)
EOSINOPHIL # BLD AUTO: 0.14 K/UL (ref 0–0.51)
EOSINOPHIL NFR BLD: 1.8 % (ref 0–6.9)
ERYTHROCYTE [DISTWIDTH] IN BLOOD BY AUTOMATED COUNT: 46.6 FL (ref 35.9–50)
GFR SERPLBLD CREATININE-BSD FMLA CKD-EPI: 114 ML/MIN/1.73 M 2
GLOBULIN SER CALC-MCNC: 3.2 G/DL (ref 1.9–3.5)
GLUCOSE SERPL-MCNC: 170 MG/DL (ref 65–99)
HCG SERPL QL: NEGATIVE
HCT VFR BLD AUTO: 34 % (ref 37–47)
HGB BLD-MCNC: 9.8 G/DL (ref 12–16)
LYMPHOCYTES # BLD AUTO: 2.89 K/UL (ref 1–4.8)
LYMPHOCYTES NFR BLD: 37.1 % (ref 22–41)
MANUAL DIFF BLD: NORMAL
MCH RBC QN AUTO: 19.9 PG (ref 27–33)
MCHC RBC AUTO-ENTMCNC: 28.8 G/DL (ref 32.2–35.5)
MCV RBC AUTO: 69.1 FL (ref 81.4–97.8)
MICROCYTES BLD QL SMEAR: ABNORMAL
MONOCYTES # BLD AUTO: 0.14 K/UL (ref 0–0.85)
MONOCYTES NFR BLD AUTO: 1.8 % (ref 0–13.4)
NEUTROPHILS # BLD AUTO: 4.63 K/UL (ref 1.82–7.42)
NEUTROPHILS NFR BLD: 59.3 % (ref 44–72)
NRBC # BLD AUTO: 0 K/UL
NRBC BLD-RTO: 0 /100 WBC (ref 0–0.2)
PLATELET # BLD AUTO: 288 K/UL (ref 164–446)
PLATELET BLD QL SMEAR: NORMAL
PMV BLD AUTO: 9.5 FL (ref 9–12.9)
POIKILOCYTOSIS BLD QL SMEAR: NORMAL
POTASSIUM SERPL-SCNC: 3.4 MMOL/L (ref 3.6–5.5)
PROT SERPL-MCNC: 7 G/DL (ref 6–8.2)
RBC # BLD AUTO: 4.92 M/UL (ref 4.2–5.4)
RBC BLD AUTO: PRESENT
SODIUM SERPL-SCNC: 139 MMOL/L (ref 135–145)
STOMATOCYTES BLD QL SMEAR: NORMAL
WBC # BLD AUTO: 7.8 K/UL (ref 4.8–10.8)

## 2025-07-24 PROCEDURE — 85027 COMPLETE CBC AUTOMATED: CPT

## 2025-07-24 PROCEDURE — 99285 EMERGENCY DEPT VISIT HI MDM: CPT

## 2025-07-24 PROCEDURE — 96374 THER/PROPH/DIAG INJ IV PUSH: CPT

## 2025-07-24 PROCEDURE — 96375 TX/PRO/DX INJ NEW DRUG ADDON: CPT

## 2025-07-24 PROCEDURE — 85007 BL SMEAR W/DIFF WBC COUNT: CPT

## 2025-07-24 PROCEDURE — 36415 COLL VENOUS BLD VENIPUNCTURE: CPT

## 2025-07-24 PROCEDURE — 80053 COMPREHEN METABOLIC PANEL: CPT

## 2025-07-24 PROCEDURE — A9270 NON-COVERED ITEM OR SERVICE: HCPCS | Performed by: EMERGENCY MEDICINE

## 2025-07-24 PROCEDURE — 84703 CHORIONIC GONADOTROPIN ASSAY: CPT

## 2025-07-24 PROCEDURE — 72131 CT LUMBAR SPINE W/O DYE: CPT

## 2025-07-24 PROCEDURE — 700111 HCHG RX REV CODE 636 W/ 250 OVERRIDE (IP): Mod: JZ | Performed by: EMERGENCY MEDICINE

## 2025-07-24 PROCEDURE — 700102 HCHG RX REV CODE 250 W/ 637 OVERRIDE(OP): Performed by: EMERGENCY MEDICINE

## 2025-07-24 PROCEDURE — 72128 CT CHEST SPINE W/O DYE: CPT

## 2025-07-24 PROCEDURE — 71260 CT THORAX DX C+: CPT

## 2025-07-24 PROCEDURE — 700117 HCHG RX CONTRAST REV CODE 255: Performed by: EMERGENCY MEDICINE

## 2025-07-24 RX ORDER — KETOROLAC TROMETHAMINE 15 MG/ML
15 INJECTION, SOLUTION INTRAMUSCULAR; INTRAVENOUS ONCE
Status: COMPLETED | OUTPATIENT
Start: 2025-07-24 | End: 2025-07-24

## 2025-07-24 RX ORDER — METHYLPREDNISOLONE 4 MG/1
TABLET ORAL
Qty: 1 EACH | Refills: 0 | Status: SHIPPED | OUTPATIENT
Start: 2025-07-24

## 2025-07-24 RX ORDER — ACETAMINOPHEN 10 MG/ML
1000 INJECTION, SOLUTION INTRAVENOUS ONCE
Status: COMPLETED | OUTPATIENT
Start: 2025-07-24 | End: 2025-07-24

## 2025-07-24 RX ORDER — NAPROXEN 500 MG/1
500 TABLET ORAL 2 TIMES DAILY WITH MEALS
Qty: 60 TABLET | Refills: 0 | Status: SHIPPED | OUTPATIENT
Start: 2025-07-24

## 2025-07-24 RX ORDER — DEXAMETHASONE SODIUM PHOSPHATE 4 MG/ML
8 INJECTION, SOLUTION INTRA-ARTICULAR; INTRALESIONAL; INTRAMUSCULAR; INTRAVENOUS; SOFT TISSUE ONCE
Status: COMPLETED | OUTPATIENT
Start: 2025-07-24 | End: 2025-07-24

## 2025-07-24 RX ADMIN — ACETAMINOPHEN 1000 MG: 10 INJECTION, SOLUTION INTRAVENOUS at 18:48

## 2025-07-24 RX ADMIN — KETOROLAC TROMETHAMINE 15 MG: 15 INJECTION, SOLUTION INTRAMUSCULAR; INTRAVENOUS at 22:09

## 2025-07-24 RX ADMIN — DEXAMETHASONE SODIUM PHOSPHATE 8 MG: 4 INJECTION INTRA-ARTICULAR; INTRALESIONAL; INTRAMUSCULAR; INTRAVENOUS; SOFT TISSUE at 22:09

## 2025-07-24 RX ADMIN — IOHEXOL 100 ML: 350 INJECTION, SOLUTION INTRAVENOUS at 21:00

## 2025-07-24 RX ADMIN — TIZANIDINE 4 MG: 4 TABLET ORAL at 22:09

## 2025-07-24 NOTE — LETTER
EMPLOYEE’S CLAIM FOR COMPENSATION/ REPORT OF INITIAL TREATMENT  FORM C-4  PLEASE TYPE OR PRINT    EMPLOYEE’S CLAIM - PROVIDE ALL INFORMATION REQUESTED   First Name                    JACQUELINE Moran                  Last Name  Zeyad Birthdate                    1980                Sex  [x]Female Claim Number (Insurer’s Use Only)     Mailing Address  [address] Age  44 y.o. Height  1.524 m (5') Weight  (!) 130 kg (286 lb 13.1 oz) Social Security Number  [id number]   [facility] Zip  [address] Telephone  [phone] (home)    Email  [email or web address]    Primary Language Spoken  English    INSURER   THIRD-PARTY   [employer]   Employee's Occupation (Job Title) When Injury or Occupational Disease Occurred      Employer's Name/Company Name  [employer]  Telephone   [phone]   Office Mail Address (Number and Street)     [address] [address]   Date of Injury (if applicable) 7/24/2025               Hours Injury (if applicable)  10:30 AM am    pm Date Employer Notified  7/24/2025 Last Day of Work after Injury or Occupational Disease  7/24/2025 Supervisor to Whom Injury Reported  Crystal   Address or Location of Accident (if applicable)  Work [1]   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 (Be specific and answer in detail. Use additional sheet if necessary)  Slipped in Tub   If you believe that you have an occupational disease, when did you first have knowledge of the disability and its relationship to your employment?  N/A Witnesses to the Accident (if applicable)  Bridget      Nature of Injury or Occupational Disease  Contusion  Part(s) of Body Injured or Affected  Lower Back Area (Lumbar Area & Lumbo-Sacral) N/A N/A    I CERTIFY THAT THE ABOVE IS TRUE AND CORRECT TO T HE BEST OF MY KNOWLEDGE AND THAT I  HAVE PROVIDED THIS INFORMATION IN ORDER TO OBTAIN THE BENEFITS OF [place], OR CHAPTER 617 [place]).  I HEREBY AUTHORIZE ANY PHYSICIAN, CHIROPRACTOR, SURGEON, PRACTITIONER OR ANY OTHER PERSON, ANY HOSPITAL, INCLUDING [facility] OR [facility], ANY  MEDICAL SERVICE ORGANIZATION, ANY INSURANCE COMPANY, OR OTHER INSTITUTION OR ORGANIZATION TO RELEASE TO EACH OTHER, ANY MEDICAL OR OTHER INFORMATION, INCLUDING BENEFITS PAID OR PAYABLE, PERTINENT TO THIS INJURY OR DISEASE, EXCEPT INFORMATION RELATIVE TO DIAGNOSIS, TREATMENT AND/OR COUNSELING FOR AIDS, PSYCHOLOGICAL CONDITIONS, ALCOHOL OR CONTROLLED SUBSTANCES, FOR WHICH I MUST GIVE SPECIFIC AUTHORIZATION.  A PHOTOSTAT OF THIS AUTHORIZATION SHALL BE VALID AS THE ORIGINAL.     Date 07/24/25   Place [facility] ED Employee’s Original or  *Electronic Signature   THIS REPORT MUST BE COMPLETED AND MAILED WITHIN 3 WORKING DAYS OF TREATMENT   Place  [facility], EMERGENCY DEPT    Name of Facility   [facility], EMERGENCY DEPT    Date 7/24/2025 Diagnosis and Description of Injury or Occupational Disease  (W19.XXXA) Fall, initial encounter  (primary encounter diagnosis)  (M54.41) Acute right-sided low back pain with right-sided sciatica  (K76.0) Hepatic steatosis  (K42.9) Umbilical hernia without obstruction and without gangrene  The primary encounter diagnosis was Fall, initial encounter. Diagnoses of Acute right-sided low back pain with right-sided sciatica, Hepatic steatosis, and Umbilical hernia without obstruction and without gangrene were also pertinent to this visit. Is there evidence that the injured employee was under the influence of alcohol and/or another controlled substance at the time of accident?  []No  [] Yes (if yes, please explain)   Hour   No   Treatment: She received Tylenol, tenacity in, Toradol, Decadron.  Prescription  for Medrol Dosepak, Naprosyn, and Azaline.    Have you advised the patient to remain off work five days or more?   No  [] Yes  If yes, indicate dates: From_ _                                                      To __ _  [] No   If no, is the injured employee capable of: [] full duty No   [] modified duty Yes    If modified duty, specify any limitations / restrictions:__________________  ___Lifting, no walking, decreased sitting___________________________     X-Ray Findings: Negative  Comments:TL, T-spine negative for fracture, CT chest abdomen pelvis negative for fracture or intra-abdominal abnormality    From information given by the employee, together with medical evidence, can you directly connect this injury or occupational disease as job incurred?  []Yes   [] No Yes    Is additional medical care by a physician indicated? []Yes [] No  Yes  Comments:Physical therapy    Do you know of any previous injury or disease contributing to this condition or occupational disease? []Yes [] No (Explain if yes)                          No   Date  7/24/2025 Print Health Care Provider’s Name  No name on file I certify that the employer’s copy of  this form was delivered to the employer on:   Address   [address] [address] [facility] [address] [facility]  INSURER'S USE ONLY                       [place] Zip   [address] Provider’s Tax ID Number   [id number]   Telephone  Dept: [phone]    Health Care Provider’s Original or Electronic Signature      e-JORDIN Azul D.O.    Degree (MD,DO, DC,PA-C,APRN)  DO  Choose (if applicable)      ORIGINAL - TREATING HEALTHCARE PROVIDER PAGE 2 - INSURER/TPA PAGE 3 - EMPLOYER PAGE 4 - EMPLOYEE             Form C-4 (rev.02/25)

## 2025-07-24 NOTE — LETTER
EMPLOYEE’S CLAIM FOR COMPENSATION/ REPORT OF INITIAL TREATMENT  FORM C-4  PLEASE TYPE OR PRINT    EMPLOYEE’S CLAIM - PROVIDE ALL INFORMATION REQUESTED   First Name                    JACQUELINE Moran                  Last Name  Zeyad Birthdate                    1980                Sex  [x]Female Claim Number (Insurer’s Use Only)     Mailing Address  [address] Age  44 y.o. Height  1.524 m (5') Weight  (!) 130 kg (286 lb 13.1 oz) Social Security Number  [id number]   [facility] [place]  [address] Telephone  [phone] (home)    Email  [email or web address]    Primary Language Spoken  English    INSURER  *** THIRD-PARTY   [employer]   Employee's Occupation (Job Title) When Injury or Occupational Disease Occurred      Employer's Name/Company Name  [employer]  Telephone      Office Mail Address (Number and Street)       Date of Injury (if applicable) 7/24/2025               Hours Injury (if applicable)  10:30 AM am    pm Date Employer Notified  7/24/2025 Last Day of Work after Injury or Occupational Disease  7/24/2025 Supervisor to Whom Injury Reported  Crystal   Address or Location of Accident (if applicable)  Work [1]   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 (Be specific and answer in detail. Use additional sheet if necessary)  Slipped in Tub   If you believe that you have an occupational disease, when did you first have knowledge of the disability and its relationship to your employment?  N/A Witnesses to the Accident (if applicable)  Bridget      Nature of Injury or Occupational Disease  Contusion  Part(s) of Body Injured or Affected  Lower Back Area (Lumbar Area & Lumbo-Sacral) N/A N/A    I CERTIFY THAT THE ABOVE IS TRUE AND CORRECT TO T HE BEST OF MY KNOWLEDGE AND THAT I HAVE  PROVIDED THIS INFORMATION IN ORDER TO OBTAIN THE BENEFITS OF [place], OR CHAPTER 617 OF [place]).  I HEREBY AUTHORIZE ANY PHYSICIAN, CHIROPRACTOR, SURGEON, PRACTITIONER OR ANY OTHER PERSON, ANY HOSPITAL, INCLUDING [facility] OR [facility], ANY  MEDICAL SERVICE ORGANIZATION, ANY INSURANCE COMPANY, OR OTHER INSTITUTION OR ORGANIZATION TO RELEASE TO EACH OTHER, ANY MEDICAL OR OTHER INFORMATION, INCLUDING BENEFITS PAID OR PAYABLE, PERTINENT TO THIS INJURY OR DISEASE, EXCEPT INFORMATION RELATIVE TO DIAGNOSIS, TREATMENT AND/OR COUNSELING FOR AIDS, PSYCHOLOGICAL CONDITIONS, ALCOHOL OR CONTROLLED SUBSTANCES, FOR WHICH I MUST GIVE SPECIFIC AUTHORIZATION.  A PHOTOSTAT OF THIS AUTHORIZATION SHALL BE VALID AS THE ORIGINAL.     Date   Place Employee’s Original or  *Electronic Signature   THIS REPORT MUST BE COMPLETED AND MAILED WITHIN 3 WORKING DAYS OF TREATMENT   Place  [facility], EMERGENCY DEPT    Name of Facility      Date 7/24/2025 Diagnosis and Description of Injury or Occupational Disease  No diagnosis found.  There were no encounter diagnoses. Is there evidence that the injured employee was under the influence of alcohol and/or another controlled substance at the time of accident?  []No  [] Yes (if yes, please explain)   Hour       Treatment:      Have you advised the patient to remain off work five days or more?      [] Yes  If yes, indicate dates: From_ _                                                      To __ _  [] No   If no, is the injured employee capable of: [] full duty     [] modified duty      If modified duty, specify any limitations / restrictions:__________________  ___ ___________________________     X-Ray Findings:      From information given by the employee, together with medical evidence, can you directly connect this injury or occupational disease as job incurred?  []Yes   []  No      Is additional medical care by a physician indicated? []Yes [] No       Do you know of any previous injury or disease contributing to this condition or occupational disease? []Yes [] No (Explain if yes)                              Date  7/24/2025 Print Health Care Provider’s Name  No name on file I certify that the employer’s copy of  this form was delivered to the employer on:   Address    INSURER'S USE ONLY                       City    State    Zip    Provider’s Tax ID Number      Telephone  Dept: [phone]    Health Care Provider’s Original or Electronic Signature           Degree (MD,DO, DC,PARosendaC,APRN)  {Provider Degrees:89643}  Choose (if applicable)      ORIGINAL - TREATING HEALTHCARE PROVIDER PAGE 2 - INSURER/TPA PAGE 3 - EMPLOYER PAGE 4 - EMPLOYEE             Form C-4 (rev.02/25)

## 2025-07-24 NOTE — LETTER
EMPLOYEE’S CLAIM FOR COMPENSATION/ REPORT OF INITIAL TREATMENT  FORM C-4  PLEASE TYPE OR PRINT    EMPLOYEE’S CLAIM - PROVIDE ALL INFORMATION REQUESTED   First Name                    JACQUELINE Moran                  Last Name  Zeyad Birthdate                    1980                Sex  [x]Female Claim Number (Insurer’s Use Only)     Mailing Address  [address] Age  44 y.o. Height  1.524 m (5') Weight  (!) 130 kg (286 lb 13.1 oz) Social Security Number  [id number]   [facility]  [address] Telephone  [phone] (home)    Email  [email or web address]    Primary Language Spoken  English    INSURER  *** THIRD-PARTY   [employer]   Employee's Occupation (Job Title) When Injury or Occupational Disease Occurred      Employer's Name/Company Name  [employer]  Tele[employer]      Office Mail Address (Number and Street)       Date of Injury (if applicable) 7/24/2025               Hours Injury (if applicable)  10:30 AM am    pm Date Employer Notified  7/24/2025 Last Day of Work after Injury or Occupational Disease  7/24/2025 Supervisor to Whom Injury Reported  Crystal   Address or Location of Accident (if applicable)  Work [1]   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 (Be specific and answer in detail. Use additional sheet if necessary)  Slipped in Tub   If you believe that you have an occupational disease, when did you first have knowledge of the disability and its relationship to your employment?  N/A Witnesses to the Accident (if applicable)  Bridget      Nature of Injury or Occupational Disease  Contusion  Part(s) of Body Injured or Affected  Lower Back Area (Lumbar Area & Lumbo-Sacral) N/A N/A    I CERTIFY THAT THE ABOVE IS TRUE AND CORRECT TO T HE BEST OF MY KNOWLEDGE AND THAT I HAVE  PROVIDED THIS INFORMATION IN ORDER TO OBTAIN THE BENEFITS OF [place], OR CHAPTER 617 OF [place]).  I HEREBY AUTHORIZE ANY PHYSICIAN, CHIROPRACTOR, SURGEON, PRACTITIONER OR ANY OTHER PERSON, ANY HOSPITAL, INCLUDING [facility] OR [facility], ANY  MEDICAL SERVICE ORGANIZATION, ANY INSURANCE COMPANY, OR OTHER INSTITUTION OR ORGANIZATION TO RELEASE TO EACH OTHER, ANY MEDICAL OR OTHER INFORMATION, INCLUDING BENEFITS PAID OR PAYABLE, PERTINENT TO THIS INJURY OR DISEASE, EXCEPT INFORMATION RELATIVE TO DIAGNOSIS, TREATMENT AND/OR COUNSELING FOR AIDS, PSYCHOLOGICAL CONDITIONS, ALCOHOL OR CONTROLLED SUBSTANCES, FOR WHICH I MUST GIVE SPECIFIC AUTHORIZATION.  A PHOTOSTAT OF THIS AUTHORIZATION SHALL BE VALID AS THE ORIGINAL.     Date   Place Employee’s Original or  *Electronic Signature   THIS REPORT MUST BE COMPLETED AND MAILED WITHIN 3 WORKING DAYS OF TREATMENT   Place  [facility], EMERGENCY DEPT    Name of Facility      Date 7/24/2025 Diagnosis and Description of Injury or Occupational Disease  (W19.XXXA) Fall, initial encounter  (primary encounter diagnosis)  (M54.41) Acute right-sided low back pain with right-sided sciatica  The primary encounter diagnosis was Fall, initial encounter. A diagnosis of Acute right-sided low back pain with right-sided sciatica was also pertinent to this visit. Is there evidence that the injured employee was under the influence of alcohol and/or another controlled substance at the time of accident?  []No  [] Yes (if yes, please explain)   Hour       Treatment:      Have you advised the patient to remain off work five days or more?      [] Yes  If yes, indicate dates: From_ _                                                      To __ _  [] No   If no, is the injured employee capable of: [] full duty     [] modified duty      If modified duty, specify any limitations /  restrictions:__________________  ___ ___________________________     X-Ray Findings:      From information given by the employee, together with medical evidence, can you directly connect this injury or occupational disease as job incurred?  []Yes   [] No      Is additional medical care by a physician indicated? []Yes [] No       Do you know of any previous injury or disease contributing to this condition or occupational disease? []Yes [] No (Explain if yes)                              Date  7/24/2025 Print Health Care Provider’s Name  No name on file I certify that the employer’s copy of  this form was delivered to the employer on:   Address    INSURER'S USE ONLY                       City    State    Zip    Provider’s Tax ID Number      Telephone  Dept: [phone]    Health Care Provider’s Original or Electronic Signature           Degree (MD,DO, DC,PANIYAH,APRN)  {Provider Degrees:[id number]}  Choose (if applicable)      ORIGINAL - TREATING HEALTHCARE PROVIDER PAGE 2 - INSURER/TPA PAGE 3 - EMPLOYER PAGE 4 - EMPLOYEE             Form C-4 (rev.02/25)

## 2025-07-24 NOTE — LETTER
EMPLOYEE’S CLAIM FOR COMPENSATION/ REPORT OF INITIAL TREATMENT  FORM C-4  PLEASE TYPE OR PRINT    EMPLOYEE’S CLAIM - PROVIDE ALL INFORMATION REQUESTED   First Name                    JACQUELINE Moran                  Last Name  Zeyad Birthdate                    1980                Sex  [x]Female Claim Number (Insurer’s Use Only)     Mailing Address  [address] Age  44 y.o. Height  1.524 m (5') Weight  (!) 130 kg (286 lb 13.1 oz) Social Security Number  [id number]   [facility]  [address] Telephone  [phone] (home)    Email  [email or web address]    Primary Language Spoken  English    INSURER  *** THIRD-PARTY   [employer] [place] [employer]   Employee's Occupation (Job Title) When Injury or Occupational Disease Occurred      Employer's Name/Company Name  [employer]  Tele[employer]      Office Mail Address (Number and Street)       Date of Injury (if applicable) 7/24/2025               Hours Injury (if applicable)  10:30 AM am    pm Date Employer Notified  7/24/2025 Last Day of Work after Injury or Occupational Disease  7/24/2025 Supervisor to Whom Injury Reported  Crystal   Address or Location of Accident (if applicable)  Work [1]   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 (Be specific and answer in detail. Use additional sheet if necessary)  Slipped in Tub   If you believe that you have an occupational disease, when did you first have knowledge of the disability and its relationship to your employment?  N/A Witnesses to the Accident (if applicable)  Bridget      Nature of Injury or Occupational Disease  Contusion  Part(s) of Body Injured or Affected  Lower Back Area (Lumbar Area & Lumbo-Sacral) N/A N/A    I CERTIFY THAT THE ABOVE IS TRUE AND CORRECT TO T HE BEST OF MY KNOWLEDGE AND THAT I HAVE  PROVIDED THIS INFORMATION IN ORDER TO OBTAIN THE BENEFITS OF [place], OR [place] 617 OF [place]).  I HEREBY AUTHORIZE ANY PHYSICIAN, CHIROPRACTOR, SURGEON, PRACTITIONER OR ANY OTHER PERSON, ANY HOSPITAL, INCLUDING [facility] OR [facility], ANY  MEDICAL SERVICE ORGANIZATION, ANY INSURANCE COMPANY, OR OTHER INSTITUTION OR ORGANIZATION TO RELEASE TO EACH OTHER, ANY MEDICAL OR OTHER INFORMATION, INCLUDING BENEFITS PAID OR PAYABLE, PERTINENT TO THIS INJURY OR DISEASE, EXCEPT INFORMATION RELATIVE TO DIAGNOSIS, TREATMENT AND/OR COUNSELING FOR AIDS, PSYCHOLOGICAL CONDITIONS, ALCOHOL OR CONTROLLED SUBSTANCES, FOR WHICH I MUST GIVE SPECIFIC AUTHORIZATION.  A PHOTOSTAT OF THIS AUTHORIZATION SHALL BE VALID AS THE ORIGINAL.     Date   Place Employee’s Original or  *Electronic Signature   THIS REPORT MUST BE COMPLETED AND MAILED WITHIN 3 WORKING DAYS OF TREATMENT   Place  [facility], EMERGENCY DEPT    Name of Facility      Date 7/24/2025 Diagnosis and Description of Injury or Occupational Disease  No diagnosis found.  There were no encounter diagnoses. Is there evidence that the injured employee was under the influence of alcohol and/or another controlled substance at the time of accident?  []No  [] Yes (if yes, please explain)   Hour       Treatment:      Have you advised the patient to remain off work five days or more?      [] Yes  If yes, indicate dates: From_ _                                                      To __ _  [] No   If no, is the injured employee capable of: [] full duty     [] modified duty      If modified duty, specify any limitations / restrictions:__________________  ___ ___________________________     X-Ray Findings:      From information given by the employee, together with medical evidence, can you directly connect this injury or occupational disease as job incurred?  []Yes   []  No      Is additional medical care by a physician indicated? []Yes [] No       Do you know of any previous injury or disease contributing to this condition or occupational disease? []Yes [] No (Explain if yes)                              Date  7/24/2025 Print Health Care Provider’s Name  No name on file I certify that the employer’s copy of  this form was delivered to the employer on:   Address    INSURER'S USE ONLY                       City    State    Zip    Provider’s Tax ID Number      Telephone  Dept: [phone]    Health Care Provider’s Original or Electronic Signature           Degree (MD,DO, DC,PARosendaC,APRN)  {Provider Degrees:[id number]}  Choose (if applicable)      ORIGINAL - TREATING HEALTHCARE PROVIDER PAGE 2 - INSURER/TPA PAGE 3 - EMPLOYER PAGE 4 - EMPLOYEE             Form C-4 (rev.02/25)

## 2025-07-24 NOTE — LETTER
EMPLOYEE’S CLAIM FOR COMPENSATION/ REPORT OF INITIAL TREATMENT  FORM C-4  PLEASE TYPE OR PRINT    EMPLOYEE’S CLAIM - PROVIDE ALL INFORMATION REQUESTED   First Name                    JACQUELINE Moran                  Last Name  Zeyad Birthdate                    1980                Sex  [x]Female Claim Number (Insurer’s Use Only)     Mailing Address  [address] Age  44 y.o. Height  1.524 m (5') Weight  (!) 130 kg (286 lb 13.1 oz) Social Security Number  [id number]   [facility]  [address] Telephone  [phone] (home)    Email  [email or web address]    Primary Language Spoken  English    INSURER  *** THIRD-PARTY   [employer] [place] [employer]   Employee's Occupation (Job Title) When Injury or Occupational Disease Occurred      Employer's Name/Company Name  [employer]  Telephone      Office Mail Address (Number and Street)       Date of Injury (if applicable) 7/24/2025               Hours Injury (if applicable)  10:30 AM am    pm Date Employer Notified  7/24/2025 Last Day of Work after Injury or Occupational Disease  7/24/2025 Supervisor to Whom Injury Reported  Crystal   Address or Location of Accident (if applicable)  Work [1]   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 (Be specific and answer in detail. Use additional sheet if necessary)  Slipped in Tub   If you believe that you have an occupational disease, when did you first have knowledge of the disability and its relationship to your employment?  N/A Witnesses to the Accident (if applicable)  Bridget      Nature of Injury or Occupational Disease  Contusion  Part(s) of Body Injured or Affected  Lower Back Area (Lumbar Area & Lumbo-Sacral) N/A N/A    I CERTIFY THAT THE ABOVE IS TRUE AND CORRECT TO T HE BEST OF MY KNOWLEDGE AND THAT I HAVE  PROVIDED THIS INFORMATION IN ORDER TO OBTAIN THE BENEFITS OF [place], [place], OR CHAPTER 617 OF [place]).  I HEREBY AUTHORIZE ANY PHYSICIAN, CHIROPRACTOR, SURGEON, PRACTITIONER OR ANY OTHER PERSON, ANY HOSPITAL, INCLUDING [facility] OR [facility], ANY  MEDICAL SERVICE ORGANIZATION, ANY INSURANCE COMPANY, OR OTHER INSTITUTION OR ORGANIZATION TO RELEASE TO EACH OTHER, ANY MEDICAL OR OTHER INFORMATION, INCLUDING BENEFITS PAID OR PAYABLE, PERTINENT TO THIS INJURY OR DISEASE, EXCEPT INFORMATION RELATIVE TO DIAGNOSIS, TREATMENT AND/OR COUNSELING FOR AIDS, PSYCHOLOGICAL CONDITIONS, ALCOHOL OR CONTROLLED SUBSTANCES, FOR WHICH I MUST GIVE SPECIFIC AUTHORIZATION.  A PHOTOSTAT OF THIS AUTHORIZATION SHALL BE VALID AS THE ORIGINAL.     Date   Place Employee’s Original or  *Electronic Signature   THIS REPORT MUST BE COMPLETED AND MAILED WITHIN 3 WORKING DAYS OF TREATMENT   Place  [facility], EMERGENCY DEPT    Name of Facility      Date 7/24/2025 Diagnosis and Description of Injury or Occupational Disease  No diagnosis found.  There were no encounter diagnoses. Is there evidence that the injured employee was under the influence of alcohol and/or another controlled substance at the time of accident?  []No  [] Yes (if yes, please explain)   Hour       Treatment:      Have you advised the patient to remain off work five days or more?      [] Yes  If yes, indicate dates: From_ _                                                      To __ _  [] No   If no, is the injured employee capable of: [] full duty     [] modified duty      If modified duty, specify any limitations / restrictions:__________________  ___ ___________________________     X-Ray Findings:      From information given by the employee, together with medical evidence, can you directly connect this injury or occupational disease as job incurred?  []Yes   []  No      Is additional medical care by a physician indicated? []Yes [] No       Do you know of any previous injury or disease contributing to this condition or occupational disease? []Yes [] No (Explain if yes)                              Date  7/24/2025 Print Health Care Provider’s Name  No name on file I certify that the employer’s copy of  this form was delivered to the employer on:   Address    INSURER'S USE ONLY                       City    State    Zip    Provider’s Tax ID Number      Telephone  Dept: [phone]    Health Care Provider’s Original or Electronic Signature           Degree (MD,DO, DC,PARosendaC,APRN)  {Provider Degrees:[id number]}  Choose (if applicable)      ORIGINAL - TREATING HEALTHCARE PROVIDER PAGE 2 - INSURER/TPA PAGE 3 - EMPLOYER PAGE 4 - EMPLOYEE             Form C-4 (rev.02/25)

## 2025-07-24 NOTE — LETTER
EMPLOYEE’S CLAIM FOR COMPENSATION/ REPORT OF INITIAL TREATMENT  FORM C-4  PLEASE TYPE OR PRINT    EMPLOYEE’S CLAIM - PROVIDE ALL INFORMATION REQUESTED   First Name                    JACQUELINE Moran                  Last Name  Zeyad Birthdate                    1980                Sex  [x]Female Claim Number (Insurer’s Use Only)     Mailing Address  [address] Age  44 y.o. Height  1.524 m (5') Weight  (!) 130 kg (286 lb 13.1 oz) Social Security Number  [id number]   [facility]  [address] Telephone  [phone] (home)    Email  [email or web address]    Primary Language Spoken  English    INSURER  *** THIRD-PARTY   [employer]   Employee's Occupation (Job Title) When Injury or Occupational Disease Occurred      Employer's Name/Company Name  [employer]  Tele[employer]      Office Mail Address (Number and Street)       Date of Injury (if applicable) 7/24/2025               Hours Injury (if applicable)  10:30 AM am    pm Date Employer Notified  7/24/2025 Last Day of Work after Injury or Occupational Disease  7/24/2025 Supervisor to Whom Injury Reported  Crystal   Address or Location of Accident (if applicable)  Work [1]   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 (Be specific and answer in detail. Use additional sheet if necessary)  Slipped in Tub   If you believe that you have an occupational disease, when did you first have knowledge of the disability and its relationship to your employment?  N/A Witnesses to the Accident (if applicable)  Bridget      Nature of Injury or Occupational Disease  Contusion  Part(s) of Body Injured or Affected  Lower Back Area (Lumbar Area & Lumbo-Sacral) N/A N/A    I CERTIFY THAT THE ABOVE IS TRUE AND CORRECT TO T HE BEST OF MY KNOWLEDGE AND THAT I HAVE  PROVIDED THIS INFORMATION IN ORDER TO OBTAIN THE BENEFITS OF [place] AND [place], [place], OR [place] 617 [place]).  I HEREBY AUTHORIZE ANY PHYSICIAN, CHIROPRACTOR, SURGEON, PRACTITIONER OR ANY OTHER PERSON, ANY HOSPITAL, INCLUDING [facility] OR [facility], ANY  MEDICAL SERVICE ORGANIZATION, ANY INSURANCE COMPANY, OR OTHER INSTITUTION OR ORGANIZATION TO RELEASE TO EACH OTHER, ANY MEDICAL OR OTHER INFORMATION, INCLUDING BENEFITS PAID OR PAYABLE, PERTINENT TO THIS INJURY OR DISEASE, EXCEPT INFORMATION RELATIVE TO DIAGNOSIS, TREATMENT AND/OR COUNSELING FOR AIDS, PSYCHOLOGICAL CONDITIONS, ALCOHOL OR CONTROLLED SUBSTANCES, FOR WHICH I MUST GIVE SPECIFIC AUTHORIZATION.  A PHOTOSTAT OF THIS AUTHORIZATION SHALL BE VALID AS THE ORIGINAL.     Date   Place Employee’s Original or  *Electronic Signature   THIS REPORT MUST BE COMPLETED AND MAILED WITHIN 3 WORKING DAYS OF TREATMENT   Place  [facility], EMERGENCY DEPT    Name of Facility      Date 7/24/2025 Diagnosis and Description of Injury or Occupational Disease  No diagnosis found.  There were no encounter diagnoses. Is there evidence that the injured employee was under the influence of alcohol and/or another controlled substance at the time of accident?  []No  [] Yes (if yes, please explain)   Hour       Treatment:      Have you advised the patient to remain off work five days or more?      [] Yes  If yes, indicate dates: From_ _                                                      To __ _  [] No   If no, is the injured employee capable of: [] full duty     [] modified duty      If modified duty, specify any limitations / restrictions:__________________  ___ ___________________________     X-Ray Findings:      From information given by the employee, together with medical evidence, can you directly connect this injury or occupational disease as job incurred?  []Yes   []  No      Is additional medical care by a physician indicated? []Yes [] No       Do you know of any previous injury or disease contributing to this condition or occupational disease? []Yes [] No (Explain if yes)                              Date  7/24/2025 Print Health Care Provider’s Name  No name on file I certify that the employer’s copy of  this form was delivered to the employer on:   Address    INSURER'S USE ONLY                       City    State    Zip    Provider’s Tax ID Number      Telephone  Dept: [phone]    Health Care Provider’s Original or Electronic Signature           Degree (MD,DO, DC,PARosendaC,APRN)  {Provider Degrees:[id number]}  Choose (if applicable)      ORIGINAL - TREATING HEALTHCARE PROVIDER PAGE 2 - INSURER/TPA PAGE 3 - EMPLOYER PAGE 4 - EMPLOYEE             Form C-4 (rev.02/25)

## 2025-07-25 NOTE — DISCHARGE INSTRUCTIONS
No heavy lifting or bending for 3 days.  Return to [facility] emergency department for fever, leg weakness (inability to walk or bear weight), loss of bowel or bladder control, loss of feeling between your legs or any new symptoms.      Back Pain (Lumbosacral Strain)  Back pain is one of the most common causes of pain. There are many causes of back pain. Most are not serious conditions.    CAUSES  Your backbone (spinal column) is made up of 24 main vertebral bodies, the sacrum, and the coccyx. These are held together by muscles and tough, fibrous tissue (ligaments). Nerve roots pass through the openings between the vertebrae. A sudden move or injury to the back may cause injury to, or pressure on, these nerves. This may result in localized back pain or pain movement (radiation) into the buttocks, down the leg, and into the foot. Sharp, shooting pain from the buttock down the back of the leg (sciatica) is frequently associated with a ruptured (herniated) disc. Pain may be caused by muscle spasm alone.  Your caregiver can often find the cause of your pain by the details of your symptoms and an exam. In some cases, you may need tests (such as X-rays). Your caregiver will work with you to decide if any tests are needed based on your specific exam.  HOME CARE INSTRUCTIONS  Avoid an underactive lifestyle. Active exercise, as directed by your caregiver, is your greatest weapon against back pain.   Avoid hard physical activities (tennis, racquetball, water-skiing) if you are not in proper physical condition for it. This may aggravate and/or create problems.   If you have a back problem, avoid sports requiring sudden body movements. Swimming and walking are generally safer activities.   Maintain good posture.   Avoid becoming overweight (obese).   Use bed rest for only the most extreme, sudden (acute) episode. Your caregiver will help you determine how much bed rest is necessary.   For acute conditions,  you may put ice on the injured area.   Put ice in a plastic bag.   Place a towel between your skin and the bag.   Leave the ice on for 30 minutes at a time, every 2 hours, or as needed.   After you are improved and more active, it may help to apply heat for 30 minutes before activities.   See your caregiver if you are having pain that lasts longer than expected. Your caregiver can advise appropriate exercises and/or therapy if needed. With conditioning, most back problems can be avoided.  SEEK IMMEDIATE MEDICAL CARE IF:  You have numbness, tingling, weakness, or problems with the use of your arms or legs.   You experience severe back pain not relieved with medicines.   There is a change in bowel or bladder control.   You have increasing pain in any area of the body, including your belly (abdomen).   You notice shortness of breath, dizziness, or feel faint.   You feel sick to your stomach (nauseous), are throwing up (vomiting), or become sweaty.   You notice discoloration of your toes or legs, or your feet get very cold.   Your back pain is getting worse.   You have an oral temperature above 102° F (38.9° C), not controlled by medicine.   MAKE SURE YOU:   Understand these instructions.   Will watch your condition.   Will get help right away if you are not doing well or get worse.   Document Released: 03/14/2011   [employer]® Patient Information ©2011 [employer].  Back Exercises  Back exercises help treat and prevent back injuries. The goal of back exercises is to increase the strength of your abdominal and back muscles and the flexibility of your back. These exercises should be started when you no longer have back pain. Back exercises include:  Pelvic Tilt - Lie on your back with your knees bent. Tilt your pelvis until the lower part of your back is against the floor. Hold this position 5-10 sec and repeat 5-10 times.   Knee to Chest - Pull first one knee up against your chest and hold for 20-30 seconds, repeat this  with the other knee, and then both knees. This may be done with the other leg straight or bent, whichever feels better.   Sit-Ups or Curl-Ups - Bend your knees 90 degrees. Start with tilting your pelvis, and do a partial, slow sit-up, lifting your trunk only 30-45 degrees off the floor. Take at least 2-3 sec for each sit-up. Do not do sit-ups with your knees out straight. If partial sit-ups are difficult, simply do the above but with only tightening your abdominal muscles and holding it as directed.   Hip-Lift - Lie on your back with your knees flexed 90 degrees. Push down with your feet and shoulders as you raise your hips a couple inches off the floor; hold for 10 sec, repeat 5-10 times.   Back arches - Lie on your stomach, propping yourself up on bent elbows. Slowly press on your hands, causing an arch in your low back. Repeat 3-5 times. Any initial stiffness and discomfort should lessen with repetition over time.   Shoulder-Lifts - Lie face down with arms beside your body. Keep hips and torso pressed to floor as you slowly lift your head and shoulders off the floor.   Do not overdo your exercises, especially in the beginning. Exercises may cause you some mild back discomfort which lasts for a few minutes; however, if the pain is more severe, or lasts for more than 15 minutes, do not continue exercises until you see your caregiver. Improvement with exercise therapy for back problems is slow.   See your caregivers for assistance with developing a proper back exercise program.  Document Released: 01/25/2006 Document Re-Released: 03/16/2010  [employer]® Patient Information ©2011 [employer].

## 2025-07-25 NOTE — ED TRIAGE NOTES
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
